# Patient Record
Sex: MALE | Race: WHITE | NOT HISPANIC OR LATINO
[De-identification: names, ages, dates, MRNs, and addresses within clinical notes are randomized per-mention and may not be internally consistent; named-entity substitution may affect disease eponyms.]

---

## 2017-05-31 ENCOUNTER — APPOINTMENT (OUTPATIENT)
Dept: CARDIOLOGY | Facility: CLINIC | Age: 62
End: 2017-05-31

## 2017-05-31 ENCOUNTER — NON-APPOINTMENT (OUTPATIENT)
Age: 62
End: 2017-05-31

## 2017-05-31 VITALS
SYSTOLIC BLOOD PRESSURE: 132 MMHG | OXYGEN SATURATION: 98 % | HEART RATE: 62 BPM | BODY MASS INDEX: 30.2 KG/M2 | DIASTOLIC BLOOD PRESSURE: 74 MMHG | WEIGHT: 223 LBS | HEIGHT: 72 IN

## 2017-12-06 ENCOUNTER — APPOINTMENT (OUTPATIENT)
Dept: CARDIOLOGY | Facility: CLINIC | Age: 62
End: 2017-12-06
Payer: COMMERCIAL

## 2017-12-06 VITALS
RESPIRATION RATE: 16 BRPM | WEIGHT: 233 LBS | HEIGHT: 72 IN | DIASTOLIC BLOOD PRESSURE: 74 MMHG | SYSTOLIC BLOOD PRESSURE: 144 MMHG | OXYGEN SATURATION: 98 % | HEART RATE: 62 BPM | BODY MASS INDEX: 31.56 KG/M2

## 2017-12-06 PROCEDURE — 93000 ELECTROCARDIOGRAM COMPLETE: CPT

## 2017-12-06 PROCEDURE — 99215 OFFICE O/P EST HI 40 MIN: CPT

## 2017-12-06 RX ORDER — DOXYCYCLINE HYCLATE 100 MG/1
100 CAPSULE ORAL
Qty: 14 | Refills: 0 | Status: DISCONTINUED | COMMUNITY
Start: 2017-10-25 | End: 2017-12-06

## 2018-05-09 ENCOUNTER — NON-APPOINTMENT (OUTPATIENT)
Age: 63
End: 2018-05-09

## 2018-05-09 ENCOUNTER — APPOINTMENT (OUTPATIENT)
Dept: CARDIOLOGY | Facility: CLINIC | Age: 63
End: 2018-05-09
Payer: COMMERCIAL

## 2018-05-09 VITALS
HEART RATE: 62 BPM | DIASTOLIC BLOOD PRESSURE: 74 MMHG | SYSTOLIC BLOOD PRESSURE: 147 MMHG | OXYGEN SATURATION: 97 % | HEIGHT: 72 IN | BODY MASS INDEX: 31.42 KG/M2 | WEIGHT: 232 LBS

## 2018-05-09 PROCEDURE — 93000 ELECTROCARDIOGRAM COMPLETE: CPT

## 2018-05-09 PROCEDURE — 99215 OFFICE O/P EST HI 40 MIN: CPT

## 2018-05-09 RX ORDER — PEN NEEDLE, DIABETIC 29 G X1/2"
31G X 5/16" NEEDLE, DISPOSABLE MISCELLANEOUS
Qty: 240 | Refills: 0 | Status: ACTIVE | COMMUNITY
Start: 2017-09-25

## 2018-05-09 RX ORDER — BLOOD SUGAR DIAGNOSTIC
STRIP MISCELLANEOUS
Qty: 300 | Refills: 0 | Status: ACTIVE | COMMUNITY
Start: 2017-09-25

## 2018-11-14 ENCOUNTER — NON-APPOINTMENT (OUTPATIENT)
Age: 63
End: 2018-11-14

## 2018-11-14 ENCOUNTER — APPOINTMENT (OUTPATIENT)
Dept: CARDIOLOGY | Facility: CLINIC | Age: 63
End: 2018-11-14
Payer: COMMERCIAL

## 2018-11-14 VITALS
HEART RATE: 64 BPM | DIASTOLIC BLOOD PRESSURE: 75 MMHG | SYSTOLIC BLOOD PRESSURE: 162 MMHG | WEIGHT: 232 LBS | OXYGEN SATURATION: 97 % | RESPIRATION RATE: 16 BRPM | BODY MASS INDEX: 31.42 KG/M2 | HEIGHT: 72 IN

## 2018-11-14 PROCEDURE — 99214 OFFICE O/P EST MOD 30 MIN: CPT

## 2018-11-14 PROCEDURE — 93000 ELECTROCARDIOGRAM COMPLETE: CPT

## 2018-11-20 NOTE — HISTORY OF PRESENT ILLNESS
[FreeTextEntry1] : Doing okay. Occasional feeling of anxiety in his chest. Denies chest pain, shortness of breath or palpitations. Occasional left shoulder pains unrelated to exertion. Also notes occasional fluttering in the chest that is brief and self-limited.

## 2018-11-20 NOTE — PHYSICAL EXAM
[General Appearance - Well Developed] : well developed [Normal Appearance] : normal appearance [Well Groomed] : well groomed [General Appearance - Well Nourished] : well nourished [No Deformities] : no deformities [General Appearance - In No Acute Distress] : no acute distress [Normal Conjunctiva] : the conjunctiva exhibited no abnormalities [Eyelids - No Xanthelasma] : the eyelids demonstrated no xanthelasmas [Normal Oral Mucosa] : normal oral mucosa [No Oral Pallor] : no oral pallor [No Oral Cyanosis] : no oral cyanosis [Respiration, Rhythm And Depth] : normal respiratory rhythm and effort [Exaggerated Use Of Accessory Muscles For Inspiration] : no accessory muscle use [Auscultation Breath Sounds / Voice Sounds] : lungs were clear to auscultation bilaterally [Heart Rate And Rhythm] : heart rate and rhythm were normal [Heart Sounds] : normal S1 and S2 [Murmurs] : no murmurs present [Edema] : no peripheral edema present [Abdomen Soft] : soft [Abdomen Tenderness] : non-tender [Nail Clubbing] : no clubbing of the fingernails [Cyanosis, Localized] : no localized cyanosis [Petechial Hemorrhages (___cm)] : no petechial hemorrhages [Skin Color & Pigmentation] : normal skin color and pigmentation [] : no rash [No Venous Stasis] : no venous stasis [Skin Lesions] : no skin lesions [No Skin Ulcers] : no skin ulcer [No Xanthoma] : no  xanthoma was observed [Oriented To Time, Place, And Person] : oriented to person, place, and time [Affect] : the affect was normal [Mood] : the mood was normal [No Anxiety] : not feeling anxious [FreeTextEntry1] : no carotid bruits or JVD

## 2018-11-20 NOTE — DISCUSSION/SUMMARY
[Cardiomyopathy] : cardiomyopathy [Coronary Artery Disease] : coronary artery disease [Stable] : stable [Hypertension] : hypertension [FreeTextEntry1] : \par Currently stable from a cardiovascular standpoint. Hypertensive today. Chronic systolic heart failure secondary to ischemic cardiomyopathy. Currently euvolemic. Stable CAD (prior CABG and stents). Atypical chest symptoms. Continue current medications. Given patient’s CAD history, will schedule a pharmacologic nuclear stress test to rule out any significant CAD/ischemia. In addition, will schedule an echocardiogram to reassess his cardiac structures and function. Pending the test results, I will make further recommendations. Follow up in 4 months.

## 2018-12-19 ENCOUNTER — RX RENEWAL (OUTPATIENT)
Age: 63
End: 2018-12-19

## 2018-12-28 ENCOUNTER — APPOINTMENT (OUTPATIENT)
Dept: CV DIAGNOSTICS | Facility: HOSPITAL | Age: 63
End: 2018-12-28
Payer: COMMERCIAL

## 2018-12-28 ENCOUNTER — APPOINTMENT (OUTPATIENT)
Dept: CV DIAGNOSITCS | Facility: HOSPITAL | Age: 63
End: 2018-12-28
Payer: COMMERCIAL

## 2018-12-28 ENCOUNTER — OUTPATIENT (OUTPATIENT)
Dept: OUTPATIENT SERVICES | Facility: HOSPITAL | Age: 63
LOS: 1 days | End: 2018-12-28

## 2018-12-28 DIAGNOSIS — Z98.89 OTHER SPECIFIED POSTPROCEDURAL STATES: Chronic | ICD-10-CM

## 2018-12-28 DIAGNOSIS — I25.10 ATHEROSCLEROTIC HEART DISEASE OF NATIVE CORONARY ARTERY WITHOUT ANGINA PECTORIS: ICD-10-CM

## 2018-12-28 DIAGNOSIS — Z95.1 PRESENCE OF AORTOCORONARY BYPASS GRAFT: Chronic | ICD-10-CM

## 2018-12-28 PROCEDURE — 93018 CV STRESS TEST I&R ONLY: CPT | Mod: GC

## 2018-12-28 PROCEDURE — 78452 HT MUSCLE IMAGE SPECT MULT: CPT | Mod: 26

## 2018-12-28 PROCEDURE — 93016 CV STRESS TEST SUPVJ ONLY: CPT | Mod: GC

## 2018-12-28 PROCEDURE — 93306 TTE W/DOPPLER COMPLETE: CPT | Mod: 26

## 2019-06-20 ENCOUNTER — NON-APPOINTMENT (OUTPATIENT)
Age: 64
End: 2019-06-20

## 2019-06-20 ENCOUNTER — APPOINTMENT (OUTPATIENT)
Dept: CARDIOLOGY | Facility: CLINIC | Age: 64
End: 2019-06-20
Payer: COMMERCIAL

## 2019-06-20 VITALS
SYSTOLIC BLOOD PRESSURE: 142 MMHG | RESPIRATION RATE: 16 BRPM | BODY MASS INDEX: 31.15 KG/M2 | HEART RATE: 60 BPM | HEIGHT: 72 IN | DIASTOLIC BLOOD PRESSURE: 76 MMHG | WEIGHT: 230 LBS | OXYGEN SATURATION: 97 %

## 2019-06-20 PROCEDURE — 93000 ELECTROCARDIOGRAM COMPLETE: CPT

## 2019-06-20 PROCEDURE — 99214 OFFICE O/P EST MOD 30 MIN: CPT

## 2019-06-20 NOTE — DISCUSSION/SUMMARY
[Cardiomyopathy] : cardiomyopathy [Coronary Artery Disease] : coronary artery disease [Hypertension] : hypertension [Stable] : stable [FreeTextEntry1] : \par Currently stable from a cardiovascular standpoint. Hypertensive today. Chronic systolic heart failure secondary to ischemic cardiomyopathy. Currently euvolemic. Stable CAD. No ischemic or CHF symptoms. ECG completed today and reviewed. Continue current medications. Follow up in 6 months.

## 2019-06-20 NOTE — HISTORY OF PRESENT ILLNESS
[FreeTextEntry1] : Doing okay. Recently had a stomach virus which caused violent vomiting and diarrhea. In that setting, he developed hemorrhage in the right eye. He is anticipating right surgery. Denies chest pain, shortness of breath or palpitations. Denies dizziness or lightheadedness.

## 2019-06-20 NOTE — PHYSICAL EXAM
[General Appearance - Well Developed] : well developed [Normal Appearance] : normal appearance [Well Groomed] : well groomed [General Appearance - Well Nourished] : well nourished [No Deformities] : no deformities [General Appearance - In No Acute Distress] : no acute distress [Normal Conjunctiva] : the conjunctiva exhibited no abnormalities [Eyelids - No Xanthelasma] : the eyelids demonstrated no xanthelasmas [Normal Oral Mucosa] : normal oral mucosa [No Oral Pallor] : no oral pallor [No Oral Cyanosis] : no oral cyanosis [FreeTextEntry1] : no carotid bruits or JVD [Respiration, Rhythm And Depth] : normal respiratory rhythm and effort [Exaggerated Use Of Accessory Muscles For Inspiration] : no accessory muscle use [Auscultation Breath Sounds / Voice Sounds] : lungs were clear to auscultation bilaterally [Heart Rate And Rhythm] : heart rate and rhythm were normal [Heart Sounds] : normal S1 and S2 [Murmurs] : no murmurs present [Edema] : no peripheral edema present [Abdomen Soft] : soft [Abdomen Tenderness] : non-tender [Cyanosis, Localized] : no localized cyanosis [Skin Color & Pigmentation] : normal skin color and pigmentation [] : no rash [No Venous Stasis] : no venous stasis [Oriented To Time, Place, And Person] : oriented to person, place, and time [Affect] : the affect was normal [Mood] : the mood was normal [No Anxiety] : not feeling anxious

## 2019-11-28 ENCOUNTER — RX RENEWAL (OUTPATIENT)
Age: 64
End: 2019-11-28

## 2019-12-26 ENCOUNTER — NON-APPOINTMENT (OUTPATIENT)
Age: 64
End: 2019-12-26

## 2019-12-26 ENCOUNTER — APPOINTMENT (OUTPATIENT)
Dept: CARDIOLOGY | Facility: CLINIC | Age: 64
End: 2019-12-26
Payer: MEDICARE

## 2019-12-26 VITALS
WEIGHT: 230 LBS | OXYGEN SATURATION: 97 % | HEIGHT: 72 IN | DIASTOLIC BLOOD PRESSURE: 77 MMHG | SYSTOLIC BLOOD PRESSURE: 146 MMHG | BODY MASS INDEX: 31.15 KG/M2 | RESPIRATION RATE: 16 BRPM | HEART RATE: 61 BPM

## 2019-12-26 PROCEDURE — 99214 OFFICE O/P EST MOD 30 MIN: CPT

## 2019-12-26 PROCEDURE — 93000 ELECTROCARDIOGRAM COMPLETE: CPT

## 2019-12-26 NOTE — PHYSICAL EXAM
[General Appearance - Well Developed] : well developed [Normal Appearance] : normal appearance [General Appearance - Well Nourished] : well nourished [Well Groomed] : well groomed [No Deformities] : no deformities [General Appearance - In No Acute Distress] : no acute distress [Eyelids - No Xanthelasma] : the eyelids demonstrated no xanthelasmas [Normal Conjunctiva] : the conjunctiva exhibited no abnormalities [Normal Oral Mucosa] : normal oral mucosa [No Oral Pallor] : no oral pallor [No Oral Cyanosis] : no oral cyanosis [Respiration, Rhythm And Depth] : normal respiratory rhythm and effort [Exaggerated Use Of Accessory Muscles For Inspiration] : no accessory muscle use [Heart Sounds] : normal S1 and S2 [Auscultation Breath Sounds / Voice Sounds] : lungs were clear to auscultation bilaterally [Heart Rate And Rhythm] : heart rate and rhythm were normal [Murmurs] : no murmurs present [Edema] : no peripheral edema present [Abdomen Soft] : soft [Abdomen Tenderness] : non-tender [Cyanosis, Localized] : no localized cyanosis [Skin Color & Pigmentation] : normal skin color and pigmentation [] : no ischemic changes [No Venous Stasis] : no venous stasis [Oriented To Time, Place, And Person] : oriented to person, place, and time [Affect] : the affect was normal [Mood] : the mood was normal [No Anxiety] : not feeling anxious [FreeTextEntry1] : obese

## 2019-12-26 NOTE — DISCUSSION/SUMMARY
[Cardiomyopathy] : cardiomyopathy [Coronary Artery Disease] : coronary artery disease [Hypertension] : hypertension [Stable] : stable [Hyperlipidemia] : hyperlipidemia [FreeTextEntry1] : \par Currently stable from a cardiovascular standpoint. Hypertensive today. Chronic systolic heart failure secondary to ischemic cardiomyopathy (LVEF 35-40%). Currently euvolemic. Stable CAD (s/p CABG, mid Cx ). No ischemic or CHF symptoms. Non-fasting lipid profile from October reviewed (chol 118, LDL 48, HDL 42, trig 226). Lipids optimized. Continue current medications. ECG completed today and reviewed (findings as noted above). Follow up in 4 months. Will repeat echo after our next visit to reassess LVEF.

## 2019-12-26 NOTE — REASON FOR VISIT
[Coronary Artery Disease] : coronary artery disease [Hypertension] : hypertension [Follow-Up - Clinic] : a clinic follow-up of

## 2019-12-26 NOTE — HISTORY OF PRESENT ILLNESS
[FreeTextEntry1] : Doing okay. Denies chest pain, shortness of breath or palpitations. Has left ear issues/pain.

## 2020-05-14 ENCOUNTER — APPOINTMENT (OUTPATIENT)
Dept: CARDIOLOGY | Facility: CLINIC | Age: 65
End: 2020-05-14

## 2020-06-25 ENCOUNTER — NON-APPOINTMENT (OUTPATIENT)
Age: 65
End: 2020-06-25

## 2020-06-25 ENCOUNTER — APPOINTMENT (OUTPATIENT)
Dept: CARDIOLOGY | Facility: CLINIC | Age: 65
End: 2020-06-25
Payer: MEDICARE

## 2020-06-25 VITALS
BODY MASS INDEX: 30.88 KG/M2 | WEIGHT: 228 LBS | TEMPERATURE: 96.5 F | DIASTOLIC BLOOD PRESSURE: 67 MMHG | SYSTOLIC BLOOD PRESSURE: 148 MMHG | HEART RATE: 59 BPM | HEIGHT: 72 IN | OXYGEN SATURATION: 98 %

## 2020-06-25 PROCEDURE — 99214 OFFICE O/P EST MOD 30 MIN: CPT

## 2020-06-25 PROCEDURE — 93000 ELECTROCARDIOGRAM COMPLETE: CPT

## 2020-06-25 NOTE — DISCUSSION/SUMMARY
[Coronary Artery Disease] : coronary artery disease [Chronic Systolic Heart Failure] : chronic systolic congestive heart failure [Stable] : stable [Compensated] : compensated [Hypertension] : hypertension [FreeTextEntry1] : \par Currently stable from a cardiovascular standpoint. Hypertensive. Chronic systolic heart failure secondary to ischemic cardiomyopathy. Currently euvolemic. Stable CAD (s/p CABG, prior stents)). No ischemic or CHF symptoms. Continue current medications. ECG completed today and reviewed (baseline LBBB). Follow up in 6 months.

## 2020-06-25 NOTE — PHYSICAL EXAM
[General Appearance - Well Developed] : well developed [Normal Appearance] : normal appearance [Well Groomed] : well groomed [General Appearance - Well Nourished] : well nourished [No Deformities] : no deformities [General Appearance - In No Acute Distress] : no acute distress [Normal Conjunctiva] : the conjunctiva exhibited no abnormalities [Eyelids - No Xanthelasma] : the eyelids demonstrated no xanthelasmas [Normal Oral Mucosa] : normal oral mucosa [No Oral Pallor] : no oral pallor [No Oral Cyanosis] : no oral cyanosis [FreeTextEntry1] : no carotid bruits or JVD [Respiration, Rhythm And Depth] : normal respiratory rhythm and effort [Exaggerated Use Of Accessory Muscles For Inspiration] : no accessory muscle use [Auscultation Breath Sounds / Voice Sounds] : lungs were clear to auscultation bilaterally [Heart Rate And Rhythm] : heart rate and rhythm were normal [Heart Sounds] : normal S1 and S2 [Edema] : no peripheral edema present [Murmurs] : no murmurs present [Abdomen Soft] : soft [Abdomen Tenderness] : non-tender [Cyanosis, Localized] : no localized cyanosis [] : no rash [Skin Color & Pigmentation] : normal skin color and pigmentation [No Venous Stasis] : no venous stasis [Affect] : the affect was normal [Oriented To Time, Place, And Person] : oriented to person, place, and time [Mood] : the mood was normal [No Anxiety] : not feeling anxious

## 2020-12-31 ENCOUNTER — NON-APPOINTMENT (OUTPATIENT)
Age: 65
End: 2020-12-31

## 2020-12-31 ENCOUNTER — APPOINTMENT (OUTPATIENT)
Dept: CARDIOLOGY | Facility: CLINIC | Age: 65
End: 2020-12-31
Payer: MEDICARE

## 2020-12-31 VITALS
WEIGHT: 234 LBS | TEMPERATURE: 98 F | DIASTOLIC BLOOD PRESSURE: 64 MMHG | BODY MASS INDEX: 31.69 KG/M2 | OXYGEN SATURATION: 98 % | SYSTOLIC BLOOD PRESSURE: 128 MMHG | HEART RATE: 61 BPM | HEIGHT: 72 IN

## 2020-12-31 PROCEDURE — 93000 ELECTROCARDIOGRAM COMPLETE: CPT

## 2020-12-31 PROCEDURE — 99214 OFFICE O/P EST MOD 30 MIN: CPT

## 2020-12-31 NOTE — HISTORY OF PRESENT ILLNESS
[FreeTextEntry1] : Doing okay. Denies chest pain, shortness of breath or palpitations. Occasional left arm/hand pains.

## 2020-12-31 NOTE — DISCUSSION/SUMMARY
[Coronary Artery Disease] : coronary artery disease [Chronic Systolic Heart Failure] : chronic systolic congestive heart failure [Compensated] : compensated [Hypertension] : hypertension [Stable] : stable [FreeTextEntry1] : \par Currently stable from a cardiovascular standpoint. Normotensive. History of ischemic cardiomyopathy. Currently euvolemic. Stable CAD (s/p CABG, oRCA stent). No ischemic or CHF symptoms. Left arm pains likely secondary to left shoulder. Continue current medications. ECG completed today and reviewed. Follow up in 6 months.

## 2021-07-01 ENCOUNTER — APPOINTMENT (OUTPATIENT)
Dept: CARDIOLOGY | Facility: CLINIC | Age: 66
End: 2021-07-01
Payer: MEDICARE

## 2021-07-01 ENCOUNTER — NON-APPOINTMENT (OUTPATIENT)
Age: 66
End: 2021-07-01

## 2021-07-01 VITALS
HEART RATE: 61 BPM | SYSTOLIC BLOOD PRESSURE: 146 MMHG | WEIGHT: 233 LBS | RESPIRATION RATE: 16 BRPM | DIASTOLIC BLOOD PRESSURE: 66 MMHG | BODY MASS INDEX: 31.56 KG/M2 | OXYGEN SATURATION: 97 % | TEMPERATURE: 97.2 F | HEIGHT: 72 IN

## 2021-07-01 PROCEDURE — 93000 ELECTROCARDIOGRAM COMPLETE: CPT

## 2021-07-01 PROCEDURE — 99214 OFFICE O/P EST MOD 30 MIN: CPT

## 2021-07-05 NOTE — DISCUSSION/SUMMARY
[Cardiomyopathy] : cardiomyopathy [Coronary Artery Disease] : coronary artery disease [Stable] : stable [Hypertension] : hypertension [FreeTextEntry1] : \par Currently stable from a cardiac standpoint. Hypertensive today. Chronic systolic heart failures secondary to ischemic cardiomyopathy (LVEF 42%). Currently euvolemic. Stable CAD (s/p CABG, RCA and Cx stents). Asymptomatic. Continue current medications. ECG completed today and reviewed. Follow up in 6 months.

## 2021-07-05 NOTE — CARDIOLOGY SUMMARY
[de-identified] : \par 07/01/21 - normal sinus rhythm, first degree AV block, LBBB\par  [de-identified] : \par 12/28/18 (regadenoson MIBI) - small, moderate perfusion defect in basal inferolateral and lateral walls that is predominantly reversible, consistent with infarction with moderate edwina-infarct ischemia, LVEF 42%\par  [de-identified] : \par 12/28/18 - normal LA, mild to moderate global LV systolic dysfunction, normal RV size and function, RVSP 36 mmHg, LVEF 35%\par  [de-identified] : \par 08/16/16 (Diag) - mLM 20%, pLAD 50%, mLAD 90%, oCx 80%, mCx 100% ISR with collaterals from D1, mRamus 60%, oRCA 20% ISR, mRCA 30%, patent LIMA-mLAD, LVEDP 14 mmHg\par 11/15/05 (PCI) - TAXUS stent to oRCA\par 10/14/03 (PCI) - CYPHER stent to prox/mid Cx\par 04/21/03 (PCI) - BMS to pCx\par  [de-identified] : \par 06/29/04 - CABG x 3 vessels

## 2021-09-30 ENCOUNTER — RX RENEWAL (OUTPATIENT)
Age: 66
End: 2021-09-30

## 2022-01-12 ENCOUNTER — NON-APPOINTMENT (OUTPATIENT)
Age: 67
End: 2022-01-12

## 2022-01-12 ENCOUNTER — APPOINTMENT (OUTPATIENT)
Dept: CARDIOLOGY | Facility: CLINIC | Age: 67
End: 2022-01-12
Payer: MEDICARE

## 2022-01-12 VITALS — WEIGHT: 235 LBS | BODY MASS INDEX: 31.87 KG/M2

## 2022-01-12 VITALS
DIASTOLIC BLOOD PRESSURE: 63 MMHG | SYSTOLIC BLOOD PRESSURE: 156 MMHG | BODY MASS INDEX: 31.87 KG/M2 | HEART RATE: 68 BPM | OXYGEN SATURATION: 98 % | HEIGHT: 72 IN

## 2022-01-12 VITALS — DIASTOLIC BLOOD PRESSURE: 80 MMHG | SYSTOLIC BLOOD PRESSURE: 150 MMHG

## 2022-01-12 PROCEDURE — 99214 OFFICE O/P EST MOD 30 MIN: CPT

## 2022-01-12 PROCEDURE — 93000 ELECTROCARDIOGRAM COMPLETE: CPT

## 2022-01-17 NOTE — CARDIOLOGY SUMMARY
[de-identified] : \par 01/12/22 - normal sinus rhythm, first degree AV block, LBBB\par  [de-identified] : \par 12/28/18 (regadenoson MIBI) - small, moderate perfusion defect in basal inferolateral and lateral walls that is predominantly reversible, consistent with infarction with moderate edwina-infarct ischemia, LVEF 42%\par  [de-identified] : \par 12/28/18 - normal LA, mild to moderate global LV systolic dysfunction, normal RV size and function, RVSP 36 mmHg, LVEF 35%\par  [de-identified] : \par 08/16/16 (CATH) - mLM 20%, pLAD 50%, mLAD 90%, oCx 80%, mCx 100% ISR with collaterals from D1, mRamus 60%, oRCA 20% ISR, mRCA 30%, patent LIMA-mLAD, LVEDP 14 mmHg\par 11/15/05 (PCI) - TAXUS stent to oRCA\par 10/14/03 (PCI) - CYPHER stent to prox/mid Cx\par 04/21/03 (PCI) - BMS to pCx\par  [de-identified] : \par 06/29/04 - CABG x 3 vessels

## 2022-01-17 NOTE — HISTORY OF PRESENT ILLNESS
[FreeTextEntry1] : Doing okay. Denies chest pain, shortness of breath or palpitations. BP has been running a bit high lately.

## 2022-01-17 NOTE — DISCUSSION/SUMMARY
[Coronary Artery Disease] : coronary artery disease [Systolic Heart Failure] : systolic heart failure [Stable] : stable [Compensated] : compensated [Hypertension] : hypertension [Medication Changes Per Orders] : Medication changes are as documented in orders [FreeTextEntry1] : \par Currently stable from a cardiac standpoint. Hypertensive today. Chronic systolic heart failures secondary to ischemic cardiomyopathy (LVEF 42%). Currently euvolemic. Stable CAD (s/p CABG, RCA and Cx stents). Asymptomatic. At this time, patient is considered ACC/AHA CHF stage B. Will start hydralazine 25 mg twice daily for BP control. Continue all other current medications. ECG completed today and reviewed. Follow up in 3 months.

## 2022-05-03 RX ORDER — ROSUVASTATIN CALCIUM 20 MG/1
20 TABLET, FILM COATED ORAL
Qty: 30 | Refills: 5 | Status: DISCONTINUED | COMMUNITY
Start: 2017-12-06 | End: 2022-05-03

## 2022-07-07 ENCOUNTER — APPOINTMENT (OUTPATIENT)
Dept: CARDIOLOGY | Facility: CLINIC | Age: 67
End: 2022-07-07

## 2022-07-07 ENCOUNTER — NON-APPOINTMENT (OUTPATIENT)
Age: 67
End: 2022-07-07

## 2022-07-07 VITALS
WEIGHT: 234 LBS | HEART RATE: 56 BPM | HEIGHT: 72 IN | OXYGEN SATURATION: 98 % | BODY MASS INDEX: 31.69 KG/M2 | SYSTOLIC BLOOD PRESSURE: 151 MMHG | DIASTOLIC BLOOD PRESSURE: 70 MMHG

## 2022-07-07 VITALS — SYSTOLIC BLOOD PRESSURE: 142 MMHG | DIASTOLIC BLOOD PRESSURE: 70 MMHG

## 2022-07-07 PROCEDURE — 99214 OFFICE O/P EST MOD 30 MIN: CPT

## 2022-07-07 PROCEDURE — 93000 ELECTROCARDIOGRAM COMPLETE: CPT

## 2022-07-07 RX ORDER — FLUTICASONE PROPIONATE 50 UG/1
50 SPRAY, METERED NASAL
Qty: 16 | Refills: 0 | Status: ACTIVE | COMMUNITY
Start: 2022-04-28

## 2022-07-07 RX ORDER — HYDRALAZINE HYDROCHLORIDE 25 MG/1
25 TABLET ORAL TWICE DAILY
Qty: 60 | Refills: 5 | Status: DISCONTINUED | COMMUNITY
Start: 2022-01-12 | End: 2022-07-07

## 2022-07-07 RX ORDER — ERGOCALCIFEROL 1.25 MG/1
1.25 MG CAPSULE, LIQUID FILLED ORAL
Qty: 4 | Refills: 0 | Status: ACTIVE | COMMUNITY
Start: 2021-10-16

## 2022-07-07 RX ORDER — INSULIN LISPRO 100 [IU]/ML
100 INJECTION, SOLUTION INTRAVENOUS; SUBCUTANEOUS
Qty: 80 | Refills: 0 | Status: ACTIVE | COMMUNITY
Start: 2022-05-17

## 2022-07-07 NOTE — CARDIOLOGY SUMMARY
[de-identified] : \par 07/07/22 - sinus bradycardia, 56 bpm, first degree AV block, LBBB\par  [de-identified] : \par 12/28/18 (regadenoson MIBI) - small, moderate perfusion defect in basal inferolateral and lateral walls that is predominantly reversible, consistent with infarction with moderate edwina-infarct ischemia, LVEF 42%\par  [de-identified] : \par 12/28/18 - normal LA, mild to moderate global LV systolic dysfunction, normal RV size and function, RVSP 36 mmHg, LVEF 35%\par  [de-identified] : \par 08/16/16 (CATH) - mLM 20%, pLAD 50%, mLAD 90%, oCx 80%, mCx 100% ISR with collaterals from D1, mRamus 60%, oRCA 20% ISR, mRCA 30%, patent LIMA-mLAD, LVEDP 14 mmHg\par 11/15/05 (PCI) - TAXUS stent to oRCA\par 10/14/03 (PCI) - CYPHER stent to prox/mid Cx\par 04/21/03 (PCI) - BMS to pCx\par  [de-identified] : \par 06/29/04 - CABG x 3 vessels

## 2022-07-07 NOTE — DISCUSSION/SUMMARY
[Cardiomyopathy] : cardiomyopathy [Coronary Artery Disease] : coronary artery disease [Hypertension] : hypertension [Weight Loss] : weight loss [Low Sodium Diet] : low sodium diet [EKG obtained to assist in diagnosis and management of assessed problem(s)] : EKG obtained to assist in diagnosis and management of assessed problem(s) [Hyperlipidemia] : hyperlipidemia [Stable] : stable [FreeTextEntry1] : \par Currently stable from a cardiac standpoint. Hypertensive today (BP has been mostly 130's mmHg systolic at home). Chronic systolic heart failures secondary to ischemic cardiomyopathy (LVEF 42%). Currently euvolemic. Stable CAD (s/p CABG, RCA and Cx stents). Asymptomatic. At this time, patient is considered ACC/AHA CHF stage B. Patient unable to tolerate hydralazine. Continue current medications. ECG completed today and reviewed. Recent labs reviewed. Lipids optimized. Low salt diet and weight loss advised. Patient to follow BP readings at home. Follow up in 6 months. May consider starting Entresto pending BP control.

## 2022-09-01 ENCOUNTER — RX RENEWAL (OUTPATIENT)
Age: 67
End: 2022-09-01

## 2023-01-05 ENCOUNTER — APPOINTMENT (OUTPATIENT)
Dept: CARDIOLOGY | Facility: CLINIC | Age: 68
End: 2023-01-05
Payer: MEDICARE

## 2023-01-05 ENCOUNTER — NON-APPOINTMENT (OUTPATIENT)
Age: 68
End: 2023-01-05

## 2023-01-05 VITALS
HEIGHT: 72 IN | HEART RATE: 55 BPM | WEIGHT: 230 LBS | OXYGEN SATURATION: 99 % | DIASTOLIC BLOOD PRESSURE: 73 MMHG | BODY MASS INDEX: 31.15 KG/M2 | SYSTOLIC BLOOD PRESSURE: 146 MMHG

## 2023-01-05 PROCEDURE — 93000 ELECTROCARDIOGRAM COMPLETE: CPT

## 2023-01-05 PROCEDURE — 99215 OFFICE O/P EST HI 40 MIN: CPT

## 2023-01-10 NOTE — CARDIOLOGY SUMMARY
[de-identified] : \par 01/05/23 - sinus bradycardia, 55 bpm, first degree AV block, LBBB\par  [de-identified] : \par 12/28/18 (regadenoson MIBI) - small, moderate perfusion defect in basal inferolateral and lateral walls that is predominantly reversible, consistent with infarction with moderate edwina-infarct ischemia, LVEF 42%\par  [de-identified] : \par 12/28/18 - normal LA, mild to moderate global LV systolic dysfunction, normal RV size and function, RVSP 36 mmHg, LVEF 35%\par  [de-identified] : \par 08/16/16 (CATH) - mLM 20%, pLAD 50%, mLAD 90%, oCx 80%, mCx 100% ISR with collaterals from D1, mRamus 60%, oRCA 20% ISR, mRCA 30%, patent LIMA-mLAD, LVEDP 14 mmHg\par 11/15/05 (PCI) - TAXUS stent to oRCA\par 10/14/03 (PCI) - CYPHER stent to prox/mid Cx\par 04/21/03 (PCI) - BMS to pCx\par  [de-identified] : \par 06/29/04 - CABG x 3 vessels

## 2023-01-10 NOTE — DISCUSSION/SUMMARY
[Hyperlipidemia] : hyperlipidemia [Coronary Artery Disease] : coronary artery disease [Systolic Heart Failure] : systolic heart failure [Stable] : stable [Compensated] : compensated [Medication Changes Per Orders] : Medication changes are as documented in orders [Hypertension] : hypertension [Low Sodium Diet] : low sodium diet [FreeTextEntry1] : \par Currently stable from a cardiac standpoint. Hypertensive today (BP has been mostly 130's mmHg systolic at home). Chronic systolic heart failures secondary to ischemic cardiomyopathy (LVEF 42%). Currently euvolemic. Stable CAD (s/p CABG, RCA and Cx stents). Asymptomatic. At this time, patient is considered ACC/AHA CHF stage B. Patient unable to tolerate hydralazine. Will start Entresto 24-26 mg twice daily for management of systolic heart failure. Will discontinue Vasotec. Continue all other current medications. ECG completed today and reviewed. Recent labs from 11/15/22 reviewed. Lipids optimized (chol 126, LDL 65, HDL 40, trig 103). Low salt diet and weight loss advised. Patient to follow BP readings at home. Follow up in 2 months. [EKG obtained to assist in diagnosis and management of assessed problem(s)] : EKG obtained to assist in diagnosis and management of assessed problem(s)

## 2023-03-20 ENCOUNTER — APPOINTMENT (OUTPATIENT)
Dept: CARDIOLOGY | Facility: CLINIC | Age: 68
End: 2023-03-20
Payer: MEDICARE

## 2023-03-20 ENCOUNTER — NON-APPOINTMENT (OUTPATIENT)
Age: 68
End: 2023-03-20

## 2023-03-20 VITALS — WEIGHT: 227 LBS | BODY MASS INDEX: 30.79 KG/M2

## 2023-03-20 VITALS
BODY MASS INDEX: 30.79 KG/M2 | SYSTOLIC BLOOD PRESSURE: 143 MMHG | DIASTOLIC BLOOD PRESSURE: 77 MMHG | HEART RATE: 61 BPM | OXYGEN SATURATION: 99 % | HEIGHT: 72 IN

## 2023-03-20 PROCEDURE — 93000 ELECTROCARDIOGRAM COMPLETE: CPT

## 2023-03-20 PROCEDURE — 99214 OFFICE O/P EST MOD 30 MIN: CPT

## 2023-03-20 NOTE — DISCUSSION/SUMMARY
[Coronary Artery Disease] : coronary artery disease [Systolic Heart Failure] : systolic heart failure [Compensated] : compensated [Medication Changes Per Orders] : Medication changes are as documented in orders [Hyperlipidemia] : hyperlipidemia [Stable] : stable [Hypertension] : hypertension [Low Sodium Diet] : low sodium diet [FreeTextEntry1] : \par Currently stable from a cardiac standpoint. Hypertensive today (BP has been mostly 130's mmHg systolic at home). Chronic systolic heart failures secondary to ischemic cardiomyopathy (LVEF 42%). Currently euvolemic. Stable CAD (s/p CABG, RCA and Cx stents). Asymptomatic. At this time, patient is considered ACC/AHA CHF stage B. Will increase Entresto to 49-51 mg twice daily for management of systolic heart failure. Continue all other current medications. ECG completed today and reviewed. Recent labs from 02/24/23 reviewed. Lipids optimized (chol 128, LDL 69, HDL 42, trig 86). Potassium 4.5 and creatinine 1.54. Low salt diet advised. Will obtain an echo to reassess his cardiac structures and function. Follow up in 4 months. [EKG obtained to assist in diagnosis and management of assessed problem(s)] : EKG obtained to assist in diagnosis and management of assessed problem(s)

## 2023-03-20 NOTE — CARDIOLOGY SUMMARY
[de-identified] : \par 03/20/23 - sinus bradycardia, 59 bpm, first degree AV block, LBBB\par  [de-identified] : \par 12/28/18 (regadenoson MIBI) - small, moderate perfusion defect in basal inferolateral and lateral walls that is predominantly reversible, consistent with infarction with moderate edwina-infarct ischemia, LVEF 42%\par  [de-identified] : \par 12/28/18 - normal LA, mild to moderate global LV systolic dysfunction, normal RV size and function, RVSP 36 mmHg, LVEF 35%\par  [de-identified] : \par 06/29/04 - CABG x 3 vessels [de-identified] : \par 08/16/16 (CATH) - mLM 20%, pLAD 50%, mLAD 90%, oCx 80%, mCx 100% ISR with collaterals from D1, mRamus 60%, oRCA 20% ISR, mRCA 30%, patent LIMA-mLAD, LVEDP 14 mmHg\par 11/15/05 (PCI) - TAXUS stent to oRCA\par 10/14/03 (PCI) - CYPHER stent to prox/mid Cx\par 04/21/03 (PCI) - BMS to pCx\par

## 2023-03-20 NOTE — HISTORY OF PRESENT ILLNESS
[FreeTextEntry1] : Currently doing well. Denies chest pain, shortness of breath or palpitations. Anticipating potential lip surgery. Of note, he is taking antibiotics for a toe infection.

## 2023-03-30 ENCOUNTER — APPOINTMENT (OUTPATIENT)
Dept: CV DIAGNOSITCS | Facility: HOSPITAL | Age: 68
End: 2023-03-30

## 2023-03-30 ENCOUNTER — OUTPATIENT (OUTPATIENT)
Dept: OUTPATIENT SERVICES | Facility: HOSPITAL | Age: 68
LOS: 1 days | End: 2023-03-30
Payer: MEDICARE

## 2023-03-30 DIAGNOSIS — Z95.1 PRESENCE OF AORTOCORONARY BYPASS GRAFT: Chronic | ICD-10-CM

## 2023-03-30 DIAGNOSIS — Z98.89 OTHER SPECIFIED POSTPROCEDURAL STATES: Chronic | ICD-10-CM

## 2023-03-30 DIAGNOSIS — D33.2 BENIGN NEOPLASM OF BRAIN, UNSPECIFIED: Chronic | ICD-10-CM

## 2023-03-30 DIAGNOSIS — I50.22 CHRONIC SYSTOLIC (CONGESTIVE) HEART FAILURE: ICD-10-CM

## 2023-03-30 PROCEDURE — 93306 TTE W/DOPPLER COMPLETE: CPT | Mod: 26

## 2023-07-06 ENCOUNTER — NON-APPOINTMENT (OUTPATIENT)
Age: 68
End: 2023-07-06

## 2023-07-06 ENCOUNTER — APPOINTMENT (OUTPATIENT)
Dept: CARDIOLOGY | Facility: CLINIC | Age: 68
End: 2023-07-06
Payer: MEDICARE

## 2023-07-06 VITALS
HEIGHT: 72 IN | HEART RATE: 59 BPM | DIASTOLIC BLOOD PRESSURE: 66 MMHG | SYSTOLIC BLOOD PRESSURE: 144 MMHG | OXYGEN SATURATION: 96 %

## 2023-07-06 DIAGNOSIS — I25.5 ISCHEMIC CARDIOMYOPATHY: ICD-10-CM

## 2023-07-06 PROCEDURE — 93000 ELECTROCARDIOGRAM COMPLETE: CPT

## 2023-07-06 PROCEDURE — 99214 OFFICE O/P EST MOD 30 MIN: CPT

## 2023-07-10 NOTE — CARDIOLOGY SUMMARY
[de-identified] : \par 07/06/23 - sinus bradycardia, 58 bpm, first degree AV block, LBBB\par  [de-identified] : \par 12/28/18 (regadenoson MIBI) - small, moderate perfusion defect in basal inferolateral and lateral walls that is predominantly reversible, consistent with infarction with moderate edwina-infarct ischemia, LVEF 42%\par  [de-identified] : \par 03/30/23 - MAC, calcified AV with normal opening, moderate segmental LV systolic dysfunction, mild diastolic dysfunction, normal RV size and function, LVEF 40-45%\par 12/28/18 - normal LA, mild to moderate global LV systolic dysfunction, normal RV size and function, RVSP 36 mmHg, LVEF 35%\par  [de-identified] : \par 08/16/16 (CATH) - mLM 20%, pLAD 50%, mLAD 90%, oCx 80%, mCx 100% ISR with collaterals from D1, mRamus 60%, oRCA 20% ISR, mRCA 30%, patent LIMA-mLAD, LVEDP 14 mmHg\par 11/15/05 (PCI) - TAXUS stent to oRCA\par 10/14/03 (PCI) - CYPHER stent to prox/mid Cx\par 04/21/03 (PCI) - BMS to pCx\par  [de-identified] : \par 06/29/04 - CABG x 3 vessels

## 2023-07-10 NOTE — DISCUSSION/SUMMARY
[Coronary Artery Disease] : coronary artery disease [Systolic Heart Failure] : systolic heart failure [Stable] : stable [Compensated] : compensated [Medication Changes Per Orders] : Medication changes are as documented in orders [Hypertension] : hypertension [Low Sodium Diet] : low sodium diet [FreeTextEntry1] : \par Currently stable from a cardiac standpoint. Hypertensive today (BP has been mostly 130's mmHg systolic at home). Chronic systolic heart failures secondary to ischemic cardiomyopathy (LVEF 42%). Currently euvolemic. Stable CAD (s/p CABG, RCA and Cx stents). Asymptomatic. At this time, patient is considered ACC/AHA CHF stage B. Continue current medications including Entresto 49-51 mg twice daily. ECG completed today and reviewed. Follow up in 4-6 months. [EKG obtained to assist in diagnosis and management of assessed problem(s)] : EKG obtained to assist in diagnosis and management of assessed problem(s)

## 2024-01-11 ENCOUNTER — NON-APPOINTMENT (OUTPATIENT)
Age: 69
End: 2024-01-11

## 2024-01-11 ENCOUNTER — APPOINTMENT (OUTPATIENT)
Dept: CARDIOLOGY | Facility: CLINIC | Age: 69
End: 2024-01-11
Payer: MEDICARE

## 2024-01-11 VITALS — DIASTOLIC BLOOD PRESSURE: 74 MMHG | SYSTOLIC BLOOD PRESSURE: 152 MMHG

## 2024-01-11 VITALS
SYSTOLIC BLOOD PRESSURE: 160 MMHG | HEIGHT: 72 IN | OXYGEN SATURATION: 98 % | HEART RATE: 61 BPM | DIASTOLIC BLOOD PRESSURE: 68 MMHG | BODY MASS INDEX: 31.56 KG/M2 | WEIGHT: 233 LBS

## 2024-01-11 PROCEDURE — 99214 OFFICE O/P EST MOD 30 MIN: CPT

## 2024-01-11 PROCEDURE — 93000 ELECTROCARDIOGRAM COMPLETE: CPT

## 2024-01-15 NOTE — DISCUSSION/SUMMARY
[Coronary Artery Disease] : coronary artery disease [Systolic Heart Failure] : systolic heart failure [Stable] : stable [Compensated] : compensated [Medication Changes Per Orders] : Medication changes are as documented in orders [Hypertension] : hypertension [Low Sodium Diet] : low sodium diet [EKG obtained to assist in diagnosis and management of assessed problem(s)] : EKG obtained to assist in diagnosis and management of assessed problem(s) [FreeTextEntry1] : Currently stable from a cardiac standpoint. Hypertensive today (BP has been mostly 140's mmHg systolic at home since stopping chlorthalidone). Chronic systolic heart failures secondary to ischemic cardiomyopathy (LVEF 42%). Currently euvolemic. Stable CAD (s/p CABG, RCA and Cx stents). Asymptomatic. At this time, patient is considered ACC/AHA CHF stage B. Will increase Entresto to  mg twice daily. Continue all other current medications. ECG completed today and reviewed. Patient to monitor BP readings at home. Follow up in 4-6 months.

## 2024-01-15 NOTE — CARDIOLOGY SUMMARY
[de-identified] : 01/11/24 - sinus rhythm, first degree AV block, LBBB [de-identified] : 12/28/18 (regadenoson MIBI) - small, moderate perfusion defect in basal inferolateral and lateral walls that is predominantly reversible, consistent with infarction with moderate edwina-infarct ischemia, LVEF 42% [de-identified] : 03/30/23 - MAC, calcified AV with normal opening, moderate segmental LV systolic dysfunction, mild diastolic dysfunction, normal RV size and function, LVEF 40-45% 12/28/18 - normal LA, mild to moderate global LV systolic dysfunction, normal RV size and function, RVSP 36 mmHg, LVEF 35% [de-identified] : \par  06/29/04 - CABG x 3 vessels [de-identified] : 08/16/16 (CATH) - mLM 20%, pLAD 50%, mLAD 90%, oCx 80%, mCx 100% ISR with collaterals from D1, mRamus 60%, oRCA 20% ISR, mRCA 30%, patent LIMA-mLAD, LVEDP 14 mmHg 11/15/05 (PCI) - TAXUS stent to oRCA 10/14/03 (PCI) - CYPHER stent to prox/mid Cx 04/21/03 (PCI) - BMS to pCx

## 2024-01-15 NOTE — HISTORY OF PRESENT ILLNESS
[FreeTextEntry1] : Currently doing well. Denies chest pain, shortness of breath or palpitations. Had stopped chlorthalidone due to abnormal labs with kidney function as per patient.

## 2024-01-17 RX ORDER — SACUBITRIL AND VALSARTAN 97; 103 MG/1; MG/1
97-103 TABLET, FILM COATED ORAL TWICE DAILY
Qty: 180 | Refills: 3 | Status: ACTIVE | COMMUNITY
Start: 2023-01-05 | End: 1900-01-01

## 2024-02-23 ENCOUNTER — INPATIENT (INPATIENT)
Facility: HOSPITAL | Age: 69
LOS: 2 days | Discharge: HOME | End: 2024-02-26
Attending: STUDENT IN AN ORGANIZED HEALTH CARE EDUCATION/TRAINING PROGRAM | Admitting: STUDENT IN AN ORGANIZED HEALTH CARE EDUCATION/TRAINING PROGRAM
Payer: MEDICARE

## 2024-02-23 VITALS
DIASTOLIC BLOOD PRESSURE: 72 MMHG | RESPIRATION RATE: 16 BRPM | SYSTOLIC BLOOD PRESSURE: 127 MMHG | OXYGEN SATURATION: 94 % | TEMPERATURE: 98 F | HEART RATE: 65 BPM

## 2024-02-23 DIAGNOSIS — D33.2 BENIGN NEOPLASM OF BRAIN, UNSPECIFIED: Chronic | ICD-10-CM

## 2024-02-23 DIAGNOSIS — R79.89 OTHER SPECIFIED ABNORMAL FINDINGS OF BLOOD CHEMISTRY: ICD-10-CM

## 2024-02-23 DIAGNOSIS — R07.9 CHEST PAIN, UNSPECIFIED: ICD-10-CM

## 2024-02-23 DIAGNOSIS — I50.22 CHRONIC SYSTOLIC (CONGESTIVE) HEART FAILURE: ICD-10-CM

## 2024-02-23 DIAGNOSIS — Z98.89 OTHER SPECIFIED POSTPROCEDURAL STATES: Chronic | ICD-10-CM

## 2024-02-23 DIAGNOSIS — Z29.9 ENCOUNTER FOR PROPHYLACTIC MEASURES, UNSPECIFIED: ICD-10-CM

## 2024-02-23 DIAGNOSIS — I48.92 UNSPECIFIED ATRIAL FLUTTER: ICD-10-CM

## 2024-02-23 DIAGNOSIS — I10 ESSENTIAL (PRIMARY) HYPERTENSION: ICD-10-CM

## 2024-02-23 DIAGNOSIS — Z95.1 PRESENCE OF AORTOCORONARY BYPASS GRAFT: Chronic | ICD-10-CM

## 2024-02-23 DIAGNOSIS — E11.9 TYPE 2 DIABETES MELLITUS WITHOUT COMPLICATIONS: ICD-10-CM

## 2024-02-23 LAB
ADD ON TEST-SPECIMEN IN LAB: SIGNIFICANT CHANGE UP
ALBUMIN SERPL ELPH-MCNC: 3.6 G/DL — SIGNIFICANT CHANGE UP (ref 3.3–5)
ALP SERPL-CCNC: 87 U/L — SIGNIFICANT CHANGE UP (ref 40–120)
ALT FLD-CCNC: 12 U/L — SIGNIFICANT CHANGE UP (ref 4–41)
ANION GAP SERPL CALC-SCNC: 11 MMOL/L — SIGNIFICANT CHANGE UP (ref 7–14)
APTT BLD: 35.5 SEC — SIGNIFICANT CHANGE UP (ref 24.5–35.6)
AST SERPL-CCNC: 12 U/L — SIGNIFICANT CHANGE UP (ref 4–40)
BASOPHILS # BLD AUTO: 0.07 K/UL — SIGNIFICANT CHANGE UP (ref 0–0.2)
BASOPHILS NFR BLD AUTO: 0.7 % — SIGNIFICANT CHANGE UP (ref 0–2)
BILIRUB SERPL-MCNC: 0.5 MG/DL — SIGNIFICANT CHANGE UP (ref 0.2–1.2)
BLD GP AB SCN SERPL QL: NEGATIVE — SIGNIFICANT CHANGE UP
BUN SERPL-MCNC: 26 MG/DL — HIGH (ref 7–23)
CALCIUM SERPL-MCNC: 9.6 MG/DL — SIGNIFICANT CHANGE UP (ref 8.4–10.5)
CHLORIDE SERPL-SCNC: 106 MMOL/L — SIGNIFICANT CHANGE UP (ref 98–107)
CK MB BLD-MCNC: 2.8 % — HIGH (ref 0–2.5)
CK MB CFR SERPL CALC: 3.5 NG/ML — SIGNIFICANT CHANGE UP
CK SERPL-CCNC: 124 U/L — SIGNIFICANT CHANGE UP (ref 30–200)
CO2 SERPL-SCNC: 24 MMOL/L — SIGNIFICANT CHANGE UP (ref 22–31)
CREAT SERPL-MCNC: 1.4 MG/DL — HIGH (ref 0.5–1.3)
EGFR: 54 ML/MIN/1.73M2 — LOW
EOSINOPHIL # BLD AUTO: 0.06 K/UL — SIGNIFICANT CHANGE UP (ref 0–0.5)
EOSINOPHIL NFR BLD AUTO: 0.6 % — SIGNIFICANT CHANGE UP (ref 0–6)
FLUAV AG NPH QL: SIGNIFICANT CHANGE UP
FLUBV AG NPH QL: SIGNIFICANT CHANGE UP
GLUCOSE BLDC GLUCOMTR-MCNC: 195 MG/DL — HIGH (ref 70–99)
GLUCOSE BLDC GLUCOMTR-MCNC: 248 MG/DL — HIGH (ref 70–99)
GLUCOSE SERPL-MCNC: 222 MG/DL — HIGH (ref 70–99)
HCT VFR BLD CALC: 36.8 % — LOW (ref 39–50)
HGB BLD-MCNC: 12.4 G/DL — LOW (ref 13–17)
IANC: 7.33 K/UL — SIGNIFICANT CHANGE UP (ref 1.8–7.4)
IMM GRANULOCYTES NFR BLD AUTO: 0.6 % — SIGNIFICANT CHANGE UP (ref 0–0.9)
LYMPHOCYTES # BLD AUTO: 0.79 K/UL — LOW (ref 1–3.3)
LYMPHOCYTES # BLD AUTO: 8.4 % — LOW (ref 13–44)
MAGNESIUM SERPL-MCNC: 1.6 MG/DL — SIGNIFICANT CHANGE UP (ref 1.6–2.6)
MCHC RBC-ENTMCNC: 29.4 PG — SIGNIFICANT CHANGE UP (ref 27–34)
MCHC RBC-ENTMCNC: 33.7 GM/DL — SIGNIFICANT CHANGE UP (ref 32–36)
MCV RBC AUTO: 87.2 FL — SIGNIFICANT CHANGE UP (ref 80–100)
MONOCYTES # BLD AUTO: 1.04 K/UL — HIGH (ref 0–0.9)
MONOCYTES NFR BLD AUTO: 11.1 % — SIGNIFICANT CHANGE UP (ref 2–14)
NEUTROPHILS # BLD AUTO: 7.33 K/UL — SIGNIFICANT CHANGE UP (ref 1.8–7.4)
NEUTROPHILS NFR BLD AUTO: 78.6 % — HIGH (ref 43–77)
NRBC # BLD: 0 /100 WBCS — SIGNIFICANT CHANGE UP (ref 0–0)
NRBC # FLD: 0 K/UL — SIGNIFICANT CHANGE UP (ref 0–0)
PLATELET # BLD AUTO: 239 K/UL — SIGNIFICANT CHANGE UP (ref 150–400)
POTASSIUM SERPL-MCNC: 4.4 MMOL/L — SIGNIFICANT CHANGE UP (ref 3.5–5.3)
POTASSIUM SERPL-SCNC: 4.4 MMOL/L — SIGNIFICANT CHANGE UP (ref 3.5–5.3)
PROT SERPL-MCNC: 6.1 G/DL — SIGNIFICANT CHANGE UP (ref 6–8.3)
RBC # BLD: 4.22 M/UL — SIGNIFICANT CHANGE UP (ref 4.2–5.8)
RBC # FLD: 13.6 % — SIGNIFICANT CHANGE UP (ref 10.3–14.5)
RH IG SCN BLD-IMP: POSITIVE — SIGNIFICANT CHANGE UP
RSV RNA NPH QL NAA+NON-PROBE: SIGNIFICANT CHANGE UP
SARS-COV-2 RNA SPEC QL NAA+PROBE: SIGNIFICANT CHANGE UP
SODIUM SERPL-SCNC: 141 MMOL/L — SIGNIFICANT CHANGE UP (ref 135–145)
TROPONIN T, HIGH SENSITIVITY RESULT: 161 NG/L — CRITICAL HIGH
TROPONIN T, HIGH SENSITIVITY RESULT: 171 NG/L — CRITICAL HIGH
TSH SERPL-MCNC: 1.34 UIU/ML — SIGNIFICANT CHANGE UP (ref 0.27–4.2)
WBC # BLD: 9.35 K/UL — SIGNIFICANT CHANGE UP (ref 3.8–10.5)
WBC # FLD AUTO: 9.35 K/UL — SIGNIFICANT CHANGE UP (ref 3.8–10.5)

## 2024-02-23 PROCEDURE — 99152 MOD SED SAME PHYS/QHP 5/>YRS: CPT

## 2024-02-23 PROCEDURE — 71045 X-RAY EXAM CHEST 1 VIEW: CPT | Mod: 26

## 2024-02-23 PROCEDURE — 93459 L HRT ART/GRFT ANGIO: CPT | Mod: 26

## 2024-02-23 PROCEDURE — 99223 1ST HOSP IP/OBS HIGH 75: CPT

## 2024-02-23 PROCEDURE — 99285 EMERGENCY DEPT VISIT HI MDM: CPT

## 2024-02-23 RX ORDER — GLUCAGON INJECTION, SOLUTION 0.5 MG/.1ML
1 INJECTION, SOLUTION SUBCUTANEOUS ONCE
Refills: 0 | Status: DISCONTINUED | OUTPATIENT
Start: 2024-02-23 | End: 2024-02-26

## 2024-02-23 RX ORDER — ACETAMINOPHEN 500 MG
650 TABLET ORAL EVERY 6 HOURS
Refills: 0 | Status: DISCONTINUED | OUTPATIENT
Start: 2024-02-23 | End: 2024-02-26

## 2024-02-23 RX ORDER — HEPARIN SODIUM 5000 [USP'U]/ML
4000 INJECTION INTRAVENOUS; SUBCUTANEOUS EVERY 6 HOURS
Refills: 0 | Status: DISCONTINUED | OUTPATIENT
Start: 2024-02-23 | End: 2024-02-23

## 2024-02-23 RX ORDER — ONDANSETRON 8 MG/1
4 TABLET, FILM COATED ORAL EVERY 8 HOURS
Refills: 0 | Status: DISCONTINUED | OUTPATIENT
Start: 2024-02-23 | End: 2024-02-26

## 2024-02-23 RX ORDER — APIXABAN 2.5 MG/1
5 TABLET, FILM COATED ORAL EVERY 12 HOURS
Refills: 0 | Status: DISCONTINUED | OUTPATIENT
Start: 2024-02-23 | End: 2024-02-23

## 2024-02-23 RX ORDER — DEXTROSE 50 % IN WATER 50 %
25 SYRINGE (ML) INTRAVENOUS ONCE
Refills: 0 | Status: DISCONTINUED | OUTPATIENT
Start: 2024-02-23 | End: 2024-02-26

## 2024-02-23 RX ORDER — ASPIRIN/CALCIUM CARB/MAGNESIUM 324 MG
243 TABLET ORAL ONCE
Refills: 0 | Status: COMPLETED | OUTPATIENT
Start: 2024-02-23 | End: 2024-02-23

## 2024-02-23 RX ORDER — MAGNESIUM SULFATE 500 MG/ML
2 VIAL (ML) INJECTION ONCE
Refills: 0 | Status: COMPLETED | OUTPATIENT
Start: 2024-02-23 | End: 2024-02-23

## 2024-02-23 RX ORDER — DEXTROSE 50 % IN WATER 50 %
15 SYRINGE (ML) INTRAVENOUS ONCE
Refills: 0 | Status: DISCONTINUED | OUTPATIENT
Start: 2024-02-23 | End: 2024-02-26

## 2024-02-23 RX ORDER — CLOPIDOGREL BISULFATE 75 MG/1
75 TABLET, FILM COATED ORAL DAILY
Refills: 0 | Status: DISCONTINUED | OUTPATIENT
Start: 2024-02-23 | End: 2024-02-26

## 2024-02-23 RX ORDER — HEPARIN SODIUM 5000 [USP'U]/ML
INJECTION INTRAVENOUS; SUBCUTANEOUS
Qty: 25000 | Refills: 0 | Status: DISCONTINUED | OUTPATIENT
Start: 2024-02-23 | End: 2024-02-23

## 2024-02-23 RX ORDER — ALPRAZOLAM 0.25 MG
1 TABLET ORAL ONCE
Refills: 0 | Status: DISCONTINUED | OUTPATIENT
Start: 2024-02-23 | End: 2024-02-23

## 2024-02-23 RX ORDER — INSULIN LISPRO 100/ML
VIAL (ML) SUBCUTANEOUS EVERY 6 HOURS
Refills: 0 | Status: DISCONTINUED | OUTPATIENT
Start: 2024-02-23 | End: 2024-02-24

## 2024-02-23 RX ORDER — NITROGLYCERIN 6.5 MG
0.4 CAPSULE, EXTENDED RELEASE ORAL ONCE
Refills: 0 | Status: COMPLETED | OUTPATIENT
Start: 2024-02-23 | End: 2024-02-23

## 2024-02-23 RX ORDER — LANOLIN ALCOHOL/MO/W.PET/CERES
3 CREAM (GRAM) TOPICAL AT BEDTIME
Refills: 0 | Status: DISCONTINUED | OUTPATIENT
Start: 2024-02-23 | End: 2024-02-26

## 2024-02-23 RX ORDER — FUROSEMIDE 40 MG
40 TABLET ORAL ONCE
Refills: 0 | Status: COMPLETED | OUTPATIENT
Start: 2024-02-23 | End: 2024-02-23

## 2024-02-23 RX ORDER — CARVEDILOL PHOSPHATE 80 MG/1
25 CAPSULE, EXTENDED RELEASE ORAL EVERY 12 HOURS
Refills: 0 | Status: DISCONTINUED | OUTPATIENT
Start: 2024-02-23 | End: 2024-02-26

## 2024-02-23 RX ORDER — ASPIRIN/CALCIUM CARB/MAGNESIUM 324 MG
81 TABLET ORAL DAILY
Refills: 0 | Status: DISCONTINUED | OUTPATIENT
Start: 2024-02-24 | End: 2024-02-24

## 2024-02-23 RX ORDER — AMLODIPINE BESYLATE 2.5 MG/1
10 TABLET ORAL DAILY
Refills: 0 | Status: DISCONTINUED | OUTPATIENT
Start: 2024-02-23 | End: 2024-02-26

## 2024-02-23 RX ORDER — ALPRAZOLAM 0.25 MG
0.5 TABLET ORAL THREE TIMES A DAY
Refills: 0 | Status: DISCONTINUED | OUTPATIENT
Start: 2024-02-23 | End: 2024-02-26

## 2024-02-23 RX ORDER — PANTOPRAZOLE SODIUM 20 MG/1
40 TABLET, DELAYED RELEASE ORAL
Refills: 0 | Status: DISCONTINUED | OUTPATIENT
Start: 2024-02-24 | End: 2024-02-26

## 2024-02-23 RX ORDER — DEXTROSE 50 % IN WATER 50 %
12.5 SYRINGE (ML) INTRAVENOUS ONCE
Refills: 0 | Status: DISCONTINUED | OUTPATIENT
Start: 2024-02-23 | End: 2024-02-26

## 2024-02-23 RX ORDER — INSULIN GLARGINE 100 [IU]/ML
48 INJECTION, SOLUTION SUBCUTANEOUS AT BEDTIME
Refills: 0 | Status: DISCONTINUED | OUTPATIENT
Start: 2024-02-23 | End: 2024-02-26

## 2024-02-23 RX ORDER — INFLUENZA VIRUS VACCINE 15; 15; 15; 15 UG/.5ML; UG/.5ML; UG/.5ML; UG/.5ML
0.7 SUSPENSION INTRAMUSCULAR ONCE
Refills: 0 | Status: DISCONTINUED | OUTPATIENT
Start: 2024-02-23 | End: 2024-02-26

## 2024-02-23 RX ORDER — SODIUM CHLORIDE 9 MG/ML
1000 INJECTION, SOLUTION INTRAVENOUS
Refills: 0 | Status: DISCONTINUED | OUTPATIENT
Start: 2024-02-23 | End: 2024-02-26

## 2024-02-23 RX ORDER — FAMOTIDINE 10 MG/ML
20 INJECTION INTRAVENOUS ONCE
Refills: 0 | Status: COMPLETED | OUTPATIENT
Start: 2024-02-23 | End: 2024-02-23

## 2024-02-23 RX ORDER — ATORVASTATIN CALCIUM 80 MG/1
80 TABLET, FILM COATED ORAL AT BEDTIME
Refills: 0 | Status: DISCONTINUED | OUTPATIENT
Start: 2024-02-23 | End: 2024-02-26

## 2024-02-23 RX ORDER — SIMVASTATIN 20 MG/1
40 TABLET, FILM COATED ORAL AT BEDTIME
Refills: 0 | Status: DISCONTINUED | OUTPATIENT
Start: 2024-02-23 | End: 2024-02-23

## 2024-02-23 RX ADMIN — Medication 40 MILLIGRAM(S): at 20:40

## 2024-02-23 RX ADMIN — Medication 243 MILLIGRAM(S): at 10:30

## 2024-02-23 RX ADMIN — Medication 1 MILLIGRAM(S): at 21:20

## 2024-02-23 RX ADMIN — Medication 0.4 MILLIGRAM(S): at 20:27

## 2024-02-23 RX ADMIN — FAMOTIDINE 20 MILLIGRAM(S): 10 INJECTION INTRAVENOUS at 10:10

## 2024-02-23 RX ADMIN — Medication 25 GRAM(S): at 13:56

## 2024-02-23 RX ADMIN — HEPARIN SODIUM 1000 UNIT(S)/HR: 5000 INJECTION INTRAVENOUS; SUBCUTANEOUS at 12:31

## 2024-02-23 NOTE — H&P ADULT - PROBLEM SELECTOR PLAN 3
-  last Cr 2016 1.19  - currently 1.4   - hold Entresto in setting borderline BP and plan for angiogram today   - monitor I/O's

## 2024-02-23 NOTE — H&P ADULT - NSHPPHYSICALEXAM_GEN_ALL_CORE
Vital Signs Last 24 Hrs  T(C): 36.8 (23 Feb 2024 15:26), Max: 36.9 (23 Feb 2024 10:52)  T(F): 98.3 (23 Feb 2024 15:26), Max: 98.4 (23 Feb 2024 10:52)  HR: 68 (23 Feb 2024 15:26) (64 - 70)  BP: 107/85 (23 Feb 2024 15:26) (107/85 - 134/70)  BP(mean): 88 (23 Feb 2024 12:52) (70 - 88)  RR: 18 (23 Feb 2024 15:26) (16 - 18)  SpO2: 95% (23 Feb 2024 15:26) (94% - 95%)    Parameters below as of 23 Feb 2024 15:26  Patient On (Oxygen Delivery Method): nasal cannula  O2 Flow (L/min): 2      PHYSICAL EXAM:  GENERAL: NAD, well-developed  HEAD:  Atraumatic, Normocephalic  EYES: EOMI, PERRLA, conjunctiva and sclera clear  NECK: Supple, No JVD  CHEST/LUNG: Clear to auscultation bilaterally; No wheeze  HEART: Regular rate and rhythm; No murmurs, rubs, or gallops  ABDOMEN: Soft, Nontender, Nondistended; Bowel sounds present  EXTREMITIES:  2+ Peripheral Pulses, No clubbing, cyanosis, or edema  PSYCH: AAOx3  NEUROLOGY: non-focal  SKIN: No rashes or lesions

## 2024-02-23 NOTE — H&P ADULT - PROBLEM SELECTOR PLAN 5
- monitor FS QAC/HS  - c/w lantus 48 U sq qHS and ademalog 12 U sq AC  - monitor FS   - check A1c - monitor FS QAC/HS  - c/w lantus 48 U sq qHS and ISS for now as NPO for cath q6   - monitor FS   - check A1c  - resume mealtime insulin post cath if tolerating diet

## 2024-02-23 NOTE — ED PROVIDER NOTE - OBJECTIVE STATEMENT
68-year-old male with PMH CAD status post PCI, CABG 2004, insulin-dependent DM, HTN, HLD presents for evaluation of chest pain and palpitations.  Patient states last week had a few episodes where he felt like his chest got a little heavy, not clearly exertional, short-lived, did not prevent him from doing daily activities.  Thought it might be GERD.  Last night patient was trying to sleep when he suddenly felt his chest got heavy, felt like his heart was fluttering, got up and sat in a chair, felt his chest pain went from a 3/10 to an 8/10 briefly but then returned.  Pain radiated to his back both left and right chest, and neck.  Currently pain has improved, no history of A-fib, follows with Dr. Ozzy Read.  Not sure when his last stress was, but sees his cardiologist regularly.  No SOB, AMES, LE edema, ABD pain, nausea vomiting.  +cough, no fever.

## 2024-02-23 NOTE — H&P ADULT - TIME BILLING
Reviewed lab data, radiology results, consultants' recommendations, documentation in [place], discussed with family, ACP, interdisciplinary staff and/or intervention were performed.

## 2024-02-23 NOTE — H&P ADULT - NSICDXPASTSURGICALHX_GEN_ALL_CORE_FT
PAST SURGICAL HISTORY:  Benign brain tumor removed from frontal lobe, meningioma    H/O right inguinal hernia repair     History of incisional hernia repair     S/P CABG x 3 2003 at [facility], Dr Sutton

## 2024-02-23 NOTE — CONSULT NOTE ADULT - SUBJECTIVE AND OBJECTIVE BOX
CARDIOLOGY FELLOW CONSULT NOTE    HPI:      PMHx:   HTN (hypertension)    Hyperlipidemia    Diabetes mellitus type 2 in obese    CAD (coronary artery disease)    Stented coronary artery    Morbidly obese    Calcaneal fracture        PSHx:   S/P CABG x 3    Benign brain tumor    History of incisional hernia repair    H/O right inguinal hernia repair        Allergies:  penicillins (Unknown)      Home Meds:    Current Medications:       FAMILY HISTORY:      ROS:  CV: chest pain (-), palpitation (-), orthopnea (-), PND (-), edema (-)  PULM: SOB (-), cough (-), wheezing (-), hemoptysis (-).   CONST: fever (-), chills (-) or fatigue (-)  GI: abdominal distension (-), abdominal pain (-) , nausea/vomiting (-), hematemesis, (-), melena (-), hematochezia (-)  : dysuria (-), frequency (-), hematuria (-).   NEURO: numbness (-), weakness (-), dizziness (-)  SKIN: itching (-), rash (-)  HEENT:  visual changes (-); vertigo or throat pain (-);  neck stiffness (-)     Physical Exam:  T(F): 98.4 (-), Max: 98.4 (-)  HR: 64 (-) (64 - 65)  BP: 116/51 (-) (116/51 - 127/72)  RR: 18 (-)  SpO2: 95% (-)    GENERAL: NAD  NECK: Supple, No JVD.  CHEST/LUNG: Clear to auscultation bilaterally; No rales, rhonchi, wheezing, or rubs. Speaking in full sentences  HEART: Irregularly irregular; No murmurs, rubs, or gallops.  ABDOMEN: Bowel sounds present; Soft, Nontender, Nondistended.   EXTREMITIES:  2+ Peripheral Pulses, brisk capillary refill. No clubbing, cyanosis, or edema.  SKIN: No rashes or lesions.    Labs: Personally reviewed                        12.4   9.35  )-----------( 239      ( 2024 10:10 )             36.8         141  |  106  |  26<H>  ----------------------------<  222<H>  4.4   |  24  |  1.40<H>    Ca    9.6      2024 10:10  Mg     1.60         TPro  6.1  /  Alb  3.6  /  TBili  0.5  /  DBili  x   /  AST  12  /  ALT  12  /  AlkPhos  87      PTT - ( 2024 10:10 )  PTT:35.5 sec    CARDIAC MARKERS ( 2024 10:10 )  171 ng/L / x     / x     / x     / x     / x        Thyroid Stimulating Hormone, Serum: 1.34 uIU/mL ( @ 10:10)    ECG: Personally reviewed    Echo: Personally reviewed    Stress Test: 18 (regadenoson MIBI) - small, moderate perfusion defect in basal inferolateral and lateral walls that is predominantly reversible, consistent with infarction with moderate edwina-infarct ischemia, LVEF 42%      Echo: 23 - MAC, calcified AV with normal opening, moderate segmental LV systolic dysfunction, mild diastolic dysfunction, normal RV size and function, LVEF 40-45%  18 - normal LA, mild to moderate global LV systolic dysfunction, normal RV size and function, RVSP 36 mmHg, LVEF 35%      Cardiac Cath/PCI: 16 (CATH) - mLM 20%, pLAD 50%, mLAD 90%, oCx 80%, mCx 100% ISR with collaterals from D1, mRamus 60%, oRCA 20% ISR, mRCA 30%, patent LIMA-mLAD, LVEDP 14 mmHg  11/15/05 (PCI) - TAXUS stent to oRCA  10/14/03 (PCI) - CYPHER stent to prox/mid Cx  03 (PCI) - BMS to pCx      Cardiac Sur04 - CABG x 3 vessels    Cath: Personally reviewed    CXR: Personally reviewed   CARDIOLOGY FELLOW CONSULT NOTE    HPI:    68 y.o M with hx of CAD s/p stents to prox and mid LCx () and eventual CABG x 3 vessels in , with subsequent PCI with ARMAND to RCA () and known mLCx 100% IRS with collaterals from D1 from [facility] from 2016, who also has a hx of HFrEF (EF 40 - 45%), HTN, HLD and DM2 who presented to the ED chest pain. 1 week ago, patient reported an episode of exertional chest pain that improved with rest and was subsequently able to perform ADLs without angina, however he had another episode of substernal chest pain radiating to his back starting last night, patient radiated to his back and his throat. Pain was associated with mild SOB and palpitations prompting his visit to the ED. At the time of evaluation patient was chest pan free without SOB. He deneis any lower extremity edema,  orthopnea or PND.     PMHx:   HTN (hypertension)  Hyperlipidemia  Diabetes mellitus type 2 in obese  CAD (coronary artery disease)  Stented coronary artery  Morbidly obese  Calcaneal fracture    PSHx:   S/P CABG x 3  Benign brain tumor  History of incisional hernia repair  H/O right inguinal hernia repair    Allergies:  penicillins (Unknown)    FAMILY HISTORY:    ROS:  CV: chest pain (-), palpitation (-), orthopnea (-), PND (-), edema (-)  PULM: SOB (-), cough (-), wheezing (-), hemoptysis (-).   CONST: fever (-), chills (-) or fatigue (-)  GI: abdominal distension (-), abdominal pain (-) , nausea/vomiting (-), hematemesis, (-), melena (-), hematochezia (-)  : dysuria (-), frequency (-), hematuria (-).   NEURO: numbness (-), weakness (-), dizziness (-)  SKIN: itching (-), rash (-)  HEENT:  visual changes (-); vertigo or throat pain (-);  neck stiffness (-)     Physical Exam:  T(F): 98.4 (-), Max: 98.4 (-)  HR: 64 (-) (64 - 65)  BP: 116/51 (-) (116/51 - 127/72)  RR: 18 (-)  SpO2: 95% ()    GENERAL: NAD  NECK: Supple, No JVD.  CHEST/LUNG: Clear to auscultation bilaterally; No rales, rhonchi, wheezing, or rubs. Speaking in full sentences  HEART: Irregularly irregular; No murmurs, rubs, or gallops.  ABDOMEN: Bowel sounds present; Soft, Nontender, Nondistended.   EXTREMITIES:  2+ Peripheral Pulses, brisk capillary refill. No clubbing, cyanosis, or edema.  SKIN: No rashes or lesions.    Labs: Personally reviewed                        12.4   9.35  )-----------( 239      ( 2024 10:10 )             36.8         141  |  106  |  26<H>  ----------------------------<  222<H>  4.4   |  24  |  1.40<H>    Ca    9.6      2024 10:10  Mg     1.60         TPro  6.1  /  Alb  3.6  /  TBili  0.5  /  DBili  x   /  AST  12  /  ALT  12  /  AlkPhos  87      PTT - ( 2024 10:10 )  PTT:35.5 sec    CARDIAC MARKERS ( 2024 10:10 )  171 ng/L / x     / x     / x     / x     / x        Thyroid Stimulating Hormone, Serum: 1.34 uIU/mL ( @ 10:10)    ECG: Personally reviewed    Echo: Personally reviewed  TTE 2023  Ejection Fraction (Visual Estimate): 40-45 %  ------------------------------------------------------------------------  OBSERVATIONS:  Mitral Valve: Mitral annular calcification, otherwise  normal mitral valve.  Aortic Root: Normal aortic root.  Aortic Valve: Calcified trileaflet aortic valve with normal  opening.  Left Atrium: Normal left atrium.  LA volume index = 31  cc/m2.  Left Ventricle: Endocardial visualization enhanced with  intravenous injection of echo contrast (Definity). Moderate  segmental left ventricular systolic dysfunctin. The  inferolateral wall is dyskinetic. Septal motion is  consistentwith LBBB. Normal left ventricular internal  dimensions and wall thicknesses. Mild diastolic dysfunction  (Stage I).  Right Heart: Normal right atrium. Normal right ventricular  size and function. Normal tricuspid valve. Normal pulmonic  valve. Mild pulmonic regurgitation.  Pericardium/PleuraNormal pericardium with no pericardial  effusion.  ------------------------------------------------------------------------  CONCLUSIONS:  1. Calcified trileaflet aortic valve with normal opening.  2. Endocardial visualization enhanced with intravenous  injection of echo contrast (Definity). Moderate segmental  left ventricular systolic dysfunctin. The inferolateral  wall is dyskinetic. Septal motion is consistent with LBBB.  3. Normal right ventricular size and function.      Stress Test: 18 (regadenoson MIBI) - small, moderate perfusion defect in basal inferolateral and lateral walls that is predominantly reversible, consistent with infarction with moderate edwina-infarct ischemia, LVEF 42%      Echo: 23 - MAC, calcified AV with normal opening, moderate segmental LV systolic dysfunction, mild diastolic dysfunction, normal RV size and function, LVEF 40-45%  18 - normal LA, mild to moderate global LV systolic dysfunction, normal RV size and function, RVSP 36 mmHg, LVEF 35%      Cardiac Cath/PCI: 16 (CATH) - mLM 20%, pLAD 50%, mLAD 90%, oCx 80%, mCx 100% ISR with collaterals from D1, mRamus 60%, oRCA 20% ISR, mRCA 30%, patent LIMA-mLAD, LVEDP 14 mmHg  11/15/05 (PCI) - TAXUS stent to oRCA  10/14/03 (PCI) - CYPHER stent to prox/mid Cx  03 (PCI) - BMS to pCx      Cardiac Sur04 - CABG x 3 vessels    Cath: Personally reviewed    CXR: Personally reviewed

## 2024-02-23 NOTE — CONSULT NOTE ADULT - ATTENDING COMMENTS
personally saw and examined pt  labs and vitals reviewed  agree with above assessment and plan  extensive cardiac hx  now with cp  would r/o acs  also with afib ?new, may be etiology of symptoms  cont tele  cont bb  agree with a/c given elevated chadsvasc  currently pt comfortable appearing, euvolemic, no active cp  will follow with you

## 2024-02-23 NOTE — ED PROVIDER NOTE - ATTENDING CONTRIBUTION TO CARE
HPI: 68-year-old male with PMH CAD status post PCI, CABG 2004, insulin-dependent DM, HTN, HLD presents for evaluation of chest pain and palpitations.  Patient states last week had a few episodes where he felt like his chest got a little heavy, not clearly exertional, short-lived, did not prevent him from doing daily activities.  Thought it might be GERD.  Last night patient was trying to sleep when he suddenly felt his chest got heavy, felt like his heart was fluttering, got up and sat in a chair, felt his chest pain went from a 3/10 to an 8/10 briefly but then returned.  Pain radiated to his back both left and right chest, and neck.  Currently pain has improved, no history of A-fib, follows with Dr. Ozzy Velazco.  Not sure when his last stress was, but sees his cardiologist regularly.  No SOB, AMES, LE edema, ABD pain, nausea vomiting.  +cough, no fever.    EXAM: awake, alert, speaking full sentences, not diaphoretic, heart IRREG rate and rhythm, no pitting edema BLE, abd soft nontender.     MDM: pt with multiple medical problems that presents with chest pain x 1 wk with exertion, but has had intermittent chest discomfort and occasional SOB since. Pt thinks possible GERD but never had before. Given pt risk factors and symptoms concern for ACS is high. other considerations are GERD vs PNA vs viral illness vs MSK pain although all less likely. Pt has multiple risk factors, EKG taken in triage show LBBB which per my chart review from pt seeing his cards 1 month ago they knew about this but no mention in past about Afib which is also on EKG today. Concern is for possible ischemic even 1 wk ago when pt had chest pain with exertion and since has had palpitations and intermittent chest discomfort from possible ongoing ischemic issues. No acute STEMI at this time. Will likely admit. Will also callpt private cardiologist Dr Pranay velazco.

## 2024-02-23 NOTE — ED ADULT NURSE NOTE - NSFALLUNIVINTERV_ED_ALL_ED
Bed/Stretcher in lowest position, wheels locked, appropriate side rails in place/Call bell, personal items and telephone in reach/Instruct patient to call for assistance before getting out of bed/chair/stretcher/Non-slip footwear applied when patient is off stretcher/[place] to call system/Physically safe environment - no spills, clutter or unnecessary equipment/Purposeful proactive rounding/Room/bathroom lighting operational, light cord in reach

## 2024-02-23 NOTE — ED ADULT NURSE NOTE - OBJECTIVE STATEMENT
pt received to trauma room C. pt is AxO 3 and ambulatory. pt c/o chest discomfort, with pain radiating to his back and epigastric area x 1 weeks. pt endorses the pain was worse this morning, and took a 81mg tablet of aspirin. Pt endorses sensation of palpation, and SOB last night. pt respirations are even and unlabored on room air. pt abdomen is soft, nontender on palpation, and nondistended. pt endorses hx of stents, DM type 2, and use of Plavix. pt shows A.fibb on the tele monitor. MD aware at bedside. pt placed on Zoll. Pt has a 20G to the left hand. pt labs obtained and sent to the lab. Pt medicated as prescribed. Plan of care ongoing.

## 2024-02-23 NOTE — ED ADULT TRIAGE NOTE - CHIEF COMPLAINT QUOTE
Pt c/o intermittent chest discomfort, radiating to back x 1 week. Reports worse last night. Endorsing palpitations, and shortness of breath last night. Respirations even and unlabored. MD Read is cardiologist. phx: cardiac stent on plavix, DM1, blood glucose: 206 Pt c/o intermittent chest discomfort, radiating to back x 1 week. Reports worse last night. Endorsing palpitations, and shortness of breath last night. Respirations even and unlabored. MD Read is cardiologist. phx: cardiac stent on plavix, DM2, blood glucose: 206

## 2024-02-23 NOTE — H&P ADULT - PROBLEM SELECTOR PLAN 2
- TTE with normal MV   - EKG with aflutter  - [id number] score 4   - rate control with Coreg  - heparin drip as above - 3/23 TTE with normal MV   - EKG with aflutter  - [id number] score 4   - rate control with Coreg  - heparin drip as above

## 2024-02-23 NOTE — ED PROVIDER NOTE - PROGRESS NOTE DETAILS
KERRI: Patient has no current chest pain troponin is 171.  Creatinine is 1.40.  Patient and wife are made aware of this condition.  Patient placed on the ZOLL pads as he has had A-fib at a slow rate of 60s with some cardiac pauses.  Patient and wife are amenable to plan of being admitted but in the meantime will consult cards for recommendations and likely admission. MANNING: pt still without chest pain, pending repeat trop, EKG repeat shows RBBB and aflutter 4:1. Will continue to monitor. Shiva PGY3: Repeat Trop 161, patient remains comfortable on monitor.  Spoke to cardiology fellow who recommends medicine admission for continued telemetry and cardiac evaluation.  Dr. Read to see patient but no immediate plan to take patient to cath.  Recommends echo and to continue heparin drip.

## 2024-02-23 NOTE — ED PROVIDER NOTE - PHYSICAL EXAMINATION
CONSTITUTIONAL: NAD  SKIN: Warm  HEAD: NCAT  EYES: NL inspection  ENT: MMM  NECK: Supple  CARD: irregular rhythm, no acute murmurs appreciated  RESP: CTAB  ABD: S/NT no R/G  EXT: no LE pitting edema  NEURO: Grossly non-focal   PSYCH: Cooperative, appropriate.

## 2024-02-23 NOTE — ED ADULT NURSE NOTE - CHIEF COMPLAINT QUOTE
Pt c/o intermittent chest discomfort, radiating to back x 1 week. Reports worse last night. Endorsing palpitations, and shortness of breath last night. Respirations even and unlabored. MD Read is cardiologist. phx: cardiac stent on plavix, DM2, blood glucose: 206

## 2024-02-23 NOTE — H&P ADULT - NSHPREVIEWOFSYSTEMS_GEN_ALL_CORE
Review of Systems:   CONSTITUTIONAL: No fever, weight loss, or fatigue  EYES: No eye pain, visual disturbances, or discharge  ENMT:  No difficulty hearing, tinnitus, vertigo; No sinus or throat pain  NECK: No pain or stiffness  BREASTS: No pain, masses, or nipple discharge  RESPIRATORY: No cough, wheezing, chills or hemoptysis; No shortness of breath  CARDIOVASCULAR: No, dizziness, or leg swelling + chest pain/palpitations   GASTROINTESTINAL: No abdominal or epigastric pain. No nausea, vomiting, or hematemesis; No diarrhea or constipation. No melena or hematochezia.  GENITOURINARY: No dysuria, frequency, hematuria, or incontinence  NEUROLOGICAL: No headaches, memory loss, loss of strength, numbness, or tremors  SKIN: No itching, burning, rashes, or lesions   LYMPH NODES: No enlarged glands  ENDOCRINE: No heat or cold intolerance; No hair loss  MUSCULOSKELETAL: No joint pain or swelling; No muscle, back, or extremity pain  PSYCHIATRIC: No depression, anxiety, mood swings, or difficulty sleeping  HEME/LYMPH: No easy bruising, or bleeding gums  ALLERGY AND IMMUNOLOGIC: No hives or eczema Review of Systems:   CONSTITUTIONAL: No fever, weight loss, or fatigue  EYES: No eye pain, visual disturbances, or discharge  ENMT:  No difficulty hearing, tinnitus, vertigo; No sinus or throat pain  NECK: No pain or stiffness  BREASTS: No pain, masses, or nipple discharge  RESPIRATORY: No cough, wheezing, chills or hemoptysis;   CARDIOVASCULAR: No, dizziness, or leg swelling + chest pain/palpitations +    GASTROINTESTINAL: No abdominal or epigastric pain. No nausea, vomiting, or hematemesis; No diarrhea or constipation. No melena or hematochezia.  GENITOURINARY: No dysuria, frequency, hematuria, or incontinence  NEUROLOGICAL: No headaches, memory loss, loss of strength, numbness, or tremors  SKIN: No itching, burning, rashes, or lesions   LYMPH NODES: No enlarged glands  ENDOCRINE: No heat or cold intolerance; No hair loss  MUSCULOSKELETAL: No joint pain or swelling; No muscle, back, or extremity pain  PSYCHIATRIC: No depression, anxiety, mood swings, or difficulty sleeping  HEME/LYMPH: No easy bruising, or bleeding gums  ALLERGY AND IMMUNOLOGIC: No hives or eczema

## 2024-02-23 NOTE — H&P ADULT - PROBLEM SELECTOR PLAN 1
- EKG with RBBB   - trop x  2; 171--161  - d-d-adeline neg for age   - Concern for ACS   - already on Plavix and ASA and BB  - start high dose statin   - heparin drip   - check lipid panel   - cath today

## 2024-02-23 NOTE — ED ADULT NURSE NOTE - NSICDXPASTSURGICALHX_GEN_ALL_CORE_FT
PAST SURGICAL HISTORY:  Benign brain tumor removed from frontal lobe, meningioma    H/O right inguinal hernia repair     History of incisional hernia repair     S/P CABG x 3 2003 at [facility], Dr Sutton

## 2024-02-23 NOTE — H&P ADULT - PROBLEM SELECTOR PLAN 4
- Prior TTE- [id number]%; LBBB  - c/w ASA/Coreg/ hold entresto as above - Prior TTE- GQSH37-79%; LBBB  - CXR with interstitial markings without overt SOB/JVD; notes cough x 2 weeks; check RVP   - c/w ASA/Coreg/ hold entresto as above

## 2024-02-23 NOTE — CONSULT NOTE ADULT - ASSESSMENT
68 y.o M with hx of CAD s/p stents to prox and mid LCx (2003) and eventual CABG x 3 vessels in 2004, with subsequent PCI with ARMAND to RCA (2005) and known mLCx 100% IRS with collaterals from D1 from [facility] from 2016, who also has a hx of HFrEF (EF 40 - 45%), HTN, HLD and DM2 who presented to the ED chest pain. ECG showed new atrial fibrillation with a known LBBB.     1. Chest pain   #Troponin elevation   - Hs- troponin elevation may be secondary to new a.fib in the setting of a cardiac substrate with significant coronary disease with multiple interventions   - Given his risk profile, would also like to rule out ischemic disease  - Trend trops with CK/CMKMB  - Repeat ECG   - Continue home aspirin 81mg daily and plavix 75mg daily    2. Atrial Fibrillation ([id number] = 5 )   - Resume home coreg 25mg BID currently rate controlled  - Can start heparin drip in the meantime with   - Keep K > 4 and Mg >2  - Telemetry monitoring   68 y.o M with hx of CAD s/p stents to prox and mid LCx (2003) and eventual CABG x 3 vessels in 2004, with subsequent PCI with ARMAND to RCA (2005) and known mLCx 100% IRS with collaterals from D1 from [facility] from 2016, who also has a hx of HFrEF (EF 40 - 45%), HTN, HLD and DM2 who presented to the ED chest pain. ECG showed new atrial fibrillation with a known LBBB.     1. Chest pain   #Troponin elevation   - Hs- troponin elevation may be secondary to new a.fib in the setting of a cardiac substrate with significant coronary disease with multiple interventions   - Given his risk profile, would also like to rule out ischemic disease  - Trend trops with CK/CMKMB  - Repeat ECG   - Continue home aspirin 81mg daily and plavix 75mg daily  - Repeat TTE    2. Atrial Fibrillation ([id number] = 5 )   - Resume home coreg 25mg BID currently rate controlled  - Can start heparin drip in the meantime   - Eventual plan to start eliquis 5mg BID in place or heparin and stop the aspirin so patient is not on triple therapy  - Keep K > 4 and Mg >2  - Telemetry monitoring    3. HFrE EF 40-45%  - Does not appear to be in clinical heart failure  - Coreg 25mg BID  - Entresto 97/103mg BID

## 2024-02-23 NOTE — H&P ADULT - HISTORY OF PRESENT ILLNESS
68 y.o M with hx of CAD s/p stents to prox and mid LCx (2003) and eventual CABG x 3 vessels in 2004, with subsequent PCI with ARMAND to RCA (2005) and known mLCx 100% IRS with collaterals from D1 from [facility] from 2016, who also has a hx of HFrEF (EF 40 - 45%), HTN, HLD and DM2 p/w chest pain, Pt noted approx 2 weeks prior to admission he noted epigastric CP with associated burning in the throat  he presumed it was refulx stopped drinking coffee. He again noted LT sided CP  w/ AMES 1 week ago when he was walking from car to his house which was relieved with rest. Last evening at approximately 1 AM he noted that he had LT sided chest pain with radiation to back and with associated palpitations.

## 2024-02-23 NOTE — ED PROVIDER NOTE - NSICDXPASTSURGICALHX_GEN_ALL_CORE_FT
PAST SURGICAL HISTORY:  Benign brain tumor removed from frontal lobe, meningioma    H/O right inguinal hernia repair     History of incisional hernia repair     S/P CABG x 3 2003 at [facility], Dr Sutton

## 2024-02-23 NOTE — H&P ADULT - ASSESSMENT
69 yo M w/ hx CAD s/p stent CABG, HTN, DM on insulin p/w chest pain. + exertional angina and new onset aflutter

## 2024-02-23 NOTE — H&P ADULT - NSHPLABSRESULTS_GEN_ALL_CORE
12.4   9.35  )-----------( 239      ( 23 Feb 2024 10:10 )             36.8       02-23    141  |  106  |  26<H>  ----------------------------<  222<H>  4.4   |  24  |  1.40<H>    Ca    9.6      23 Feb 2024 10:10  Mg     1.60     02-23    TPro  6.1  /  Alb  3.6  /  TBili  0.5  /  DBili  x   /  AST  12  /  ALT  12  /  AlkPhos  87  02-23      CAPILLARY BLOOD GLUCOSE      POCT Blood Glucose.: 206 mg/dL (23 Feb 2024 09:12)      Urinalysis Basic - ( 23 Feb 2024 10:10 )    Color: x / Appearance: x / SG: x / pH: x  Gluc: 222 mg/dL / Ketone: x  / Bili: x / Urobili: x   Blood: x / Protein: x / Nitrite: x   Leuk Esterase: x / RBC: x / WBC x   Sq Epi: x / Non Sq Epi: x / Bacteria: x        PTT - ( 23 Feb 2024 10:10 )  PTT:35.5 sec    EKG - aflutter rate controlled RBBB     Radiology: < from: Xray Chest 1 View AP/PA (02.23.24 @ 10:25) >      IMPRESSION:    Prominent perihilar interstitial markings with small left-sided pleural   effusion. Adjacent atelectasis of the left lower lobe.    < end of copied text >  -< from: TTE with Doppler (w/Cont) (03.30.23 @ 08:42) >      CONCLUSIONS:  1. Calcified trileaflet aortic valve with normal opening.  2. Endocardial visualization enhanced with intravenous  injection of echo contrast (Definity). Moderate segmental  left ventricular systolic dysfunctin. The inferolateral  wall is dyskinetic. Septal motion is consistent with LBBB.  3. Normal right ventricular size and function.  ------------------------------------------------------------------------    < end of copied text >

## 2024-02-23 NOTE — PATIENT PROFILE ADULT - FALL HARM RISK - UNIVERSAL INTERVENTIONS
Bed in lowest position, wheels locked, appropriate side rails in place/Call bell, personal items and telephone in reach/Instruct patient to call for assistance before getting out of bed or chair/Non-slip footwear when patient is out of bed/[place] to call system/Physically safe environment - no spills, clutter or unnecessary equipment/Purposeful Proactive Rounding/Room/bathroom lighting operational, light cord in reach

## 2024-02-23 NOTE — ED PROVIDER NOTE - CLINICAL SUMMARY MEDICAL DECISION MAKING FREE TEXT BOX
[facility] PGY3: 68-year-old male with PMH CAD status post PCI, CABG 2004, insulin-dependent DM, HTN, HLD presents for evaluation of chest pain and palpitations, had episode of chest pain last week that and now presenting with another episode worse and new afib on labs. Plan for labs, trop, BNP. Took asa 81 at 7am. Currently pain improved.     Differential Diagnosis includes but not limited to ACS vs acute arrythmia vs delayed presentation of cardiac event. Cardiac monitor, aspirin, labs, trop, cxr. Cards consult, to be admitted.

## 2024-02-23 NOTE — ED PROVIDER NOTE - NSICDXPASTMEDICALHX_GEN_ALL_CORE_FT
PAST MEDICAL HISTORY:  CAD (coronary artery disease)     Calcaneal fracture left    Diabetes mellitus type 2 in obese     HTN (hypertension)     Hyperlipidemia     Morbidly obese     Stented coronary artery

## 2024-02-24 ENCOUNTER — TRANSCRIPTION ENCOUNTER (OUTPATIENT)
Age: 69
End: 2024-02-24

## 2024-02-24 DIAGNOSIS — I48.0 PAROXYSMAL ATRIAL FIBRILLATION: ICD-10-CM

## 2024-02-24 LAB
ADD ON TEST-SPECIMEN IN LAB: SIGNIFICANT CHANGE UP
ANION GAP SERPL CALC-SCNC: 16 MMOL/L — HIGH (ref 7–14)
APTT BLD: 95.7 SEC — HIGH (ref 24.5–35.6)
BUN SERPL-MCNC: 35 MG/DL — HIGH (ref 7–23)
CALCIUM SERPL-MCNC: 9.3 MG/DL — SIGNIFICANT CHANGE UP (ref 8.4–10.5)
CHLORIDE SERPL-SCNC: 104 MMOL/L — SIGNIFICANT CHANGE UP (ref 98–107)
CO2 SERPL-SCNC: 21 MMOL/L — LOW (ref 22–31)
CREAT SERPL-MCNC: 1.58 MG/DL — HIGH (ref 0.5–1.3)
EGFR: 47 ML/MIN/1.73M2 — LOW
GLUCOSE BLDC GLUCOMTR-MCNC: 154 MG/DL — HIGH (ref 70–99)
GLUCOSE BLDC GLUCOMTR-MCNC: 204 MG/DL — HIGH (ref 70–99)
GLUCOSE BLDC GLUCOMTR-MCNC: 257 MG/DL — HIGH (ref 70–99)
GLUCOSE BLDC GLUCOMTR-MCNC: 280 MG/DL — HIGH (ref 70–99)
GLUCOSE BLDC GLUCOMTR-MCNC: 328 MG/DL — HIGH (ref 70–99)
GLUCOSE SERPL-MCNC: 287 MG/DL — HIGH (ref 70–99)
HCT VFR BLD CALC: 36.4 % — LOW (ref 39–50)
HCT VFR BLD CALC: 36.9 % — LOW (ref 39–50)
HGB BLD-MCNC: 12 G/DL — LOW (ref 13–17)
HGB BLD-MCNC: 12.3 G/DL — LOW (ref 13–17)
MCHC RBC-ENTMCNC: 28.9 PG — SIGNIFICANT CHANGE UP (ref 27–34)
MCHC RBC-ENTMCNC: 29.1 PG — SIGNIFICANT CHANGE UP (ref 27–34)
MCHC RBC-ENTMCNC: 33 GM/DL — SIGNIFICANT CHANGE UP (ref 32–36)
MCHC RBC-ENTMCNC: 33.3 GM/DL — SIGNIFICANT CHANGE UP (ref 32–36)
MCV RBC AUTO: 87.4 FL — SIGNIFICANT CHANGE UP (ref 80–100)
MCV RBC AUTO: 87.7 FL — SIGNIFICANT CHANGE UP (ref 80–100)
NRBC # BLD: 0 /100 WBCS — SIGNIFICANT CHANGE UP (ref 0–0)
NRBC # BLD: 0 /100 WBCS — SIGNIFICANT CHANGE UP (ref 0–0)
NRBC # FLD: 0 K/UL — SIGNIFICANT CHANGE UP (ref 0–0)
NRBC # FLD: 0 K/UL — SIGNIFICANT CHANGE UP (ref 0–0)
NT-PROBNP SERPL-SCNC: HIGH PG/ML
PLATELET # BLD AUTO: 209 K/UL — SIGNIFICANT CHANGE UP (ref 150–400)
PLATELET # BLD AUTO: 230 K/UL — SIGNIFICANT CHANGE UP (ref 150–400)
POTASSIUM SERPL-MCNC: 4.1 MMOL/L — SIGNIFICANT CHANGE UP (ref 3.5–5.3)
POTASSIUM SERPL-SCNC: 4.1 MMOL/L — SIGNIFICANT CHANGE UP (ref 3.5–5.3)
RBC # BLD: 4.15 M/UL — LOW (ref 4.2–5.8)
RBC # BLD: 4.22 M/UL — SIGNIFICANT CHANGE UP (ref 4.2–5.8)
RBC # FLD: 13.6 % — SIGNIFICANT CHANGE UP (ref 10.3–14.5)
RBC # FLD: 13.9 % — SIGNIFICANT CHANGE UP (ref 10.3–14.5)
SODIUM SERPL-SCNC: 141 MMOL/L — SIGNIFICANT CHANGE UP (ref 135–145)
TROPONIN T, HIGH SENSITIVITY RESULT: 197 NG/L — CRITICAL HIGH
WBC # BLD: 9.35 K/UL — SIGNIFICANT CHANGE UP (ref 3.8–10.5)
WBC # BLD: 9.41 K/UL — SIGNIFICANT CHANGE UP (ref 3.8–10.5)
WBC # FLD AUTO: 9.35 K/UL — SIGNIFICANT CHANGE UP (ref 3.8–10.5)
WBC # FLD AUTO: 9.41 K/UL — SIGNIFICANT CHANGE UP (ref 3.8–10.5)

## 2024-02-24 PROCEDURE — 99232 SBSQ HOSP IP/OBS MODERATE 35: CPT | Mod: GC

## 2024-02-24 PROCEDURE — 71250 CT THORAX DX C-: CPT | Mod: 26

## 2024-02-24 PROCEDURE — 99232 SBSQ HOSP IP/OBS MODERATE 35: CPT

## 2024-02-24 PROCEDURE — 74176 CT ABD & PELVIS W/O CONTRAST: CPT | Mod: 26

## 2024-02-24 RX ORDER — APIXABAN 2.5 MG/1
5 TABLET, FILM COATED ORAL EVERY 12 HOURS
Refills: 0 | Status: DISCONTINUED | OUTPATIENT
Start: 2024-02-24 | End: 2024-02-26

## 2024-02-24 RX ORDER — INSULIN LISPRO 100/ML
8 VIAL (ML) SUBCUTANEOUS
Refills: 0 | Status: DISCONTINUED | OUTPATIENT
Start: 2024-02-24 | End: 2024-02-26

## 2024-02-24 RX ORDER — INSULIN LISPRO 100/ML
8 VIAL (ML) SUBCUTANEOUS ONCE
Refills: 0 | Status: COMPLETED | OUTPATIENT
Start: 2024-02-24 | End: 2024-02-24

## 2024-02-24 RX ORDER — INSULIN LISPRO 100/ML
VIAL (ML) SUBCUTANEOUS
Refills: 0 | Status: DISCONTINUED | OUTPATIENT
Start: 2024-02-24 | End: 2024-02-26

## 2024-02-24 RX ORDER — INSULIN LISPRO 100/ML
VIAL (ML) SUBCUTANEOUS AT BEDTIME
Refills: 0 | Status: DISCONTINUED | OUTPATIENT
Start: 2024-02-24 | End: 2024-02-26

## 2024-02-24 RX ORDER — RIVAROXABAN 15 MG-20MG
1 KIT ORAL
Qty: 30 | Refills: 0
Start: 2024-02-24 | End: 2024-03-24

## 2024-02-24 RX ORDER — APIXABAN 2.5 MG/1
1 TABLET, FILM COATED ORAL
Qty: 60 | Refills: 0
Start: 2024-02-24 | End: 2024-03-24

## 2024-02-24 RX ORDER — HEPARIN SODIUM 5000 [USP'U]/ML
INJECTION INTRAVENOUS; SUBCUTANEOUS
Qty: 25000 | Refills: 0 | Status: DISCONTINUED | OUTPATIENT
Start: 2024-02-24 | End: 2024-02-24

## 2024-02-24 RX ADMIN — ATORVASTATIN CALCIUM 80 MILLIGRAM(S): 80 TABLET, FILM COATED ORAL at 21:51

## 2024-02-24 RX ADMIN — PANTOPRAZOLE SODIUM 40 MILLIGRAM(S): 20 TABLET, DELAYED RELEASE ORAL at 06:51

## 2024-02-24 RX ADMIN — INSULIN GLARGINE 48 UNIT(S): 100 INJECTION, SOLUTION SUBCUTANEOUS at 21:52

## 2024-02-24 RX ADMIN — CARVEDILOL PHOSPHATE 25 MILLIGRAM(S): 80 CAPSULE, EXTENDED RELEASE ORAL at 01:00

## 2024-02-24 RX ADMIN — HEPARIN SODIUM 1800 UNIT(S)/HR: 5000 INJECTION INTRAVENOUS; SUBCUTANEOUS at 11:16

## 2024-02-24 RX ADMIN — HEPARIN SODIUM 1800 UNIT(S)/HR: 5000 INJECTION INTRAVENOUS; SUBCUTANEOUS at 03:37

## 2024-02-24 RX ADMIN — ATORVASTATIN CALCIUM 80 MILLIGRAM(S): 80 TABLET, FILM COATED ORAL at 01:00

## 2024-02-24 RX ADMIN — Medication 3: at 09:11

## 2024-02-24 RX ADMIN — CARVEDILOL PHOSPHATE 25 MILLIGRAM(S): 80 CAPSULE, EXTENDED RELEASE ORAL at 18:07

## 2024-02-24 RX ADMIN — Medication 2: at 01:14

## 2024-02-24 RX ADMIN — CLOPIDOGREL BISULFATE 75 MILLIGRAM(S): 75 TABLET, FILM COATED ORAL at 12:34

## 2024-02-24 RX ADMIN — AMLODIPINE BESYLATE 10 MILLIGRAM(S): 2.5 TABLET ORAL at 06:51

## 2024-02-24 RX ADMIN — HEPARIN SODIUM 1800 UNIT(S)/HR: 5000 INJECTION INTRAVENOUS; SUBCUTANEOUS at 07:26

## 2024-02-24 RX ADMIN — Medication 4: at 12:34

## 2024-02-24 RX ADMIN — Medication 2: at 18:55

## 2024-02-24 RX ADMIN — Medication 8 UNIT(S): at 13:07

## 2024-02-24 RX ADMIN — Medication 0.5 MILLIGRAM(S): at 15:00

## 2024-02-24 RX ADMIN — CARVEDILOL PHOSPHATE 25 MILLIGRAM(S): 80 CAPSULE, EXTENDED RELEASE ORAL at 07:06

## 2024-02-24 RX ADMIN — APIXABAN 5 MILLIGRAM(S): 2.5 TABLET, FILM COATED ORAL at 18:07

## 2024-02-24 RX ADMIN — INSULIN GLARGINE 48 UNIT(S): 100 INJECTION, SOLUTION SUBCUTANEOUS at 01:01

## 2024-02-24 RX ADMIN — Medication 81 MILLIGRAM(S): at 12:34

## 2024-02-24 NOTE — PROGRESS NOTE ADULT - NS ATTEST RISK PROBLEM GEN_ALL_CORE FT
68 year old man with history of CAD s/p PCI with stents to prox and mid LCx (2003) and eventual CABG x 3 vessels 2004, with subsequent PCI with ARMAND to RCA (2005) and known mLCx 100% IRS with collaterals from D1 from [facility] from 2016. There is also h/o HFrEF (EF 40 - 45%), HTN, HLD and DM2. He presented to the ED with chest pain and elevation of HSTropT and serum proBNP. ECG showed new atrial fibrillation with known LBBB. Elevation of HSTropT in setting of afib and complex CAD may in part be demand ischemia. [id number] = 5 points.

## 2024-02-24 NOTE — DISCHARGE NOTE PROVIDER - CARE PROVIDER_API CALL
Selwyn Rodríguez.  Family Medicine  [facility] - Dept of Family Medicine, [address] [place] [address]  Phone: [phone]  Fax: [phone]  Established Patient  Follow Up Time: 1-3 days

## 2024-02-24 NOTE — PROGRESS NOTE ADULT - SUBJECTIVE AND OBJECTIVE BOX
Byron Torrez MD  [facility] Division of Hospital Medicine  Pager: [phone]  Available via MS Teams    SUBJECTIVE / OVERNIGHT EVENTS:    Continues to have right sided chest pain (feels like a nerve)   Also with some abdominal spasms     MEDICATIONS  (STANDING):  amLODIPine   Tablet 10 milliGRAM(s) Oral daily  apixaban 5 milliGRAM(s) Oral every 12 hours  atorvastatin 80 milliGRAM(s) Oral at bedtime  carvedilol 25 milliGRAM(s) Oral every 12 hours  clopidogrel Tablet 75 milliGRAM(s) Oral daily  dextrose 5%. 1000 milliLiter(s) (50 mL/Hr) IV Continuous <Continuous>  dextrose 5%. 1000 milliLiter(s) (100 mL/Hr) IV Continuous <Continuous>  dextrose 50% Injectable 25 Gram(s) IV Push once  dextrose 50% Injectable 25 Gram(s) IV Push once  dextrose 50% Injectable 12.5 Gram(s) IV Push once  glucagon  Injectable 1 milliGRAM(s) IntraMuscular once  influenza  Vaccine (HIGH DOSE) 0.7 milliLiter(s) IntraMuscular once  insulin glargine Injectable (LANTUS) 48 Unit(s) SubCutaneous at bedtime  insulin lispro (ADMELOG) corrective regimen sliding scale   SubCutaneous at bedtime  insulin lispro (ADMELOG) corrective regimen sliding scale   SubCutaneous three times a day before meals  pantoprazole    Tablet 40 milliGRAM(s) Oral before breakfast    MEDICATIONS  (PRN):  acetaminophen     Tablet .. 650 milliGRAM(s) Oral every 6 hours PRN Temp greater or equal to 38C (100.4F), Mild Pain (1 - 3)  ALPRAZolam 0.5 milliGRAM(s) Oral three times a day PRN Anxiety  aluminum hydroxide/magnesium hydroxide/simethicone Suspension 30 milliLiter(s) Oral every 4 hours PRN Dyspepsia  dextrose Oral Gel 15 Gram(s) Oral once PRN Blood Glucose LESS THAN 70 milliGRAM(s)/deciliter  melatonin 3 milliGRAM(s) Oral at bedtime PRN Insomnia  ondansetron Injectable 4 milliGRAM(s) IV Push every 8 hours PRN Nausea and/or Vomiting      I&O's Summary    23 Feb 2024 07:01  -  24 Feb 2024 07:00  --------------------------------------------------------  IN: 0 mL / OUT: 800 mL / NET: -800 mL        PHYSICAL EXAM:  Vital Signs Last 24 Hrs  T(C): 36.5 (24 Feb 2024 14:15), Max: 36.8 (23 Feb 2024 21:15)  T(F): 97.7 (24 Feb 2024 14:15), Max: 98.2 (23 Feb 2024 21:15)  HR: 84 (24 Feb 2024 14:15) (74 - 84)  BP: 140/68 (24 Feb 2024 14:15) (125/49 - 140/68)  BP(mean): 68 (23 Feb 2024 21:15) (68 - 68)  RR: 15 (24 Feb 2024 14:15) (15 - 22)  SpO2: 93% (24 Feb 2024 15:11) (92% - 95%)    Parameters below as of 24 Feb 2024 15:11  Patient On (Oxygen Delivery Method): nasal cannula  O2 Flow (L/min): 1    CONSTITUTIONAL: NAD   EYES: conjunctiva and sclera clear  ENMT: Moist oral mucosa   NECK: Supple   RESPIRATORY: Normal respiratory effort; lungs are clear to auscultation bilaterally  CARDIOVASCULAR: Regular rate and rhythm, normal S1 and S2, no murmur/rub/gallop; Peripheral pulses are 2+ bilaterally  ABDOMEN: Nontender to palpation, normoactive bowel sounds, no rebound/guarding   MUSCULOSKELETAL: No lower extremity edema   PSYCH: A+O to person, place, and time; affect appropriate  NEUROLOGY: moves all extremities   SKIN: No rashes    LABS:                        12.3   9.41  )-----------( 230      ( 24 Feb 2024 10:33 )             36.9     02-24    141  |  104  |  35<H>  ----------------------------<  287<H>  4.1   |  21<L>  |  1.58<H>    Ca    9.3      24 Feb 2024 10:00  Mg     1.60     02-23    TPro  6.1  /  Alb  3.6  /  TBili  0.5  /  DBili  x   /  AST  12  /  ALT  12  /  AlkPhos  87  02-23    PTT - ( 24 Feb 2024 10:00 )  PTT:95.7 sec  CARDIAC MARKERS ( 23 Feb 2024 11:32 )  x     / x     / 124 U/L / x     / 3.5 ng/mL      Urinalysis Basic - ( 24 Feb 2024 10:00 )    Color: x / Appearance: x / SG: x / pH: x  Gluc: 287 mg/dL / Ketone: x  / Bili: x / Urobili: x   Blood: x / Protein: x / Nitrite: x   Leuk Esterase: x / RBC: x / WBC x   Sq Epi: x / Non Sq Epi: x / Bacteria: x            RADIOLOGY & ADDITIONAL TESTS:  Other Results Reviewed Today: BMP with stable Cr, CBC with stable Hg     COORDINATION OF CARE:  Communication: discussed plan of care with ACP

## 2024-02-24 NOTE — PROGRESS NOTE ADULT - PROBLEM SELECTOR PLAN 2
- 3/23 TTE with normal MV   - EKG with afib  - [id number] score 5  - rate control with Coreg  - change heparin drip to eliquis

## 2024-02-24 NOTE — DISCHARGE NOTE PROVIDER - NSDCMRMEDTOKEN_GEN_ALL_CORE_FT
amLODIPine 10 mg oral tablet: 1 tab(s) orally once a day  apixaban 5 mg oral tablet: 1 tab(s) orally every 12 hours INSURANCE CHECK PLEASE for copay/coinsurance  carvedilol 25 mg oral tablet: 1 tab(s) orally 2 times a day  Entresto 97 mg-103 mg oral tablet: 1 tab(s) orally 2 times a day  Lantus 100 units/mL subcutaneous solution: 48 unit(s) subcutaneous once a day (at bedtime)  NovoLOG 100 units/mL subcutaneous solution: 12 unit(s) subcutaneous 3 times a day (with meals)  Plavix 75 mg oral tablet: 1 tab(s) orally once a day  simvastatin 40 mg oral tablet: 1 tab(s) orally once a day (at bedtime)   amLODIPine 10 mg oral tablet: 1 tab(s) orally once a day  apixaban 5 mg oral tablet: 1 tab(s) orally every 12 hours INSURANCE CHECK PLEASE for copay/coinsurance  carvedilol 25 mg oral tablet: 1 tab(s) orally 2 times a day  Entresto 97 mg-103 mg oral tablet: 1 tab(s) orally 2 times a day  Lantus 100 units/mL subcutaneous solution: 48 unit(s) subcutaneous once a day (at bedtime)  NovoLOG 100 units/mL subcutaneous solution: 12 unit(s) subcutaneous 3 times a day (with meals)  Plavix 75 mg oral tablet: 1 tab(s) orally once a day  simvastatin 40 mg oral tablet: 1 tab(s) orally once a day (at bedtime)  Xarelto 20 mg oral tablet: 1 tab(s) orally once a day with evening meal  ***INSURANCE CHECK ONLY****   apixaban 5 mg oral tablet: 1 tab(s) orally every 12 hours  azithromycin 250 mg oral tablet: 1 tab(s) orally once a day  carvedilol 25 mg oral tablet: 1 tab(s) orally 2 times a day  Entresto 97 mg-103 mg oral tablet: 1 tab(s) orally 2 times a day  glucose 40% oral gel: 15 gram(s) orally prn  Lantus 100 units/mL subcutaneous solution: 48 unit(s) subcutaneous once a day (at bedtime)  melatonin 3 mg oral tablet: 1 tab(s) orally once a day (at bedtime) As needed Insomnia  NovoLOG 100 units/mL subcutaneous solution: 8 unit(s) subcutaneous 3 times a day (with meals)  pantoprazole 40 mg oral delayed release tablet: 1 tab(s) orally once a day (before a meal)  Plavix 75 mg oral tablet: 1 tab(s) orally once a day  simvastatin 40 mg oral tablet: 1 tab(s) orally once a day (at bedtime)

## 2024-02-24 NOTE — PROGRESS NOTE ADULT - TIME BILLING
68 year old man with history of CAD s/p PCI with stents to prox and mid LCx (2003) and eventual CABG x 3 vessels 2004, with subsequent PCI with ARMAND to RCA (2005) and known mLCx 100% IRS with collaterals from D1 from [facility] from 2016. There is also h/o HFrEF (EF 40 - 45%), HTN, HLD and DM2. He presented to the ED with chest pain and elevation of HSTropT and serum proBNP. ECG showed new atrial fibrillation with known LBBB. Elevation of HSTropT in setting of afib and complex CAD may in part be demand ischemia. [id number] = 5 points.

## 2024-02-24 NOTE — DISCHARGE NOTE PROVIDER - NSDCCPCAREPLAN_GEN_ALL_CORE_FT
PRINCIPAL DISCHARGE DIAGNOSIS  Diagnosis: Chest pain  Assessment and Plan of Treatment: Continue Eliquis and Plavix, do not stop unless instructed by your physician.  Continue low salt, fat, cholesterol and carbohydrate diet. Follow up with cardiologist and primary care physician's routine appointment.  PLEASE FOLLOW UP WITH YOUR PRIMARY CARE PHYSICIAN TO FACILITATE A CT SCAN OF THE CHEST, ABDOMEN AND PELVIS.   PLEASE FOLLOW UP WITH YOUR CARDIOLOGIST TO FACILITATE A FOLLOW UP TRANSTHORACIC ECHOCARDIOGRAM,         SECONDARY DISCHARGE DIAGNOSES  Diagnosis: Chronic systolic congestive heart failure  Assessment and Plan of Treatment: Low salt diet, fluid restriction to 1500 ml daily, monitor your fluid intake and weight daily, exercise as tolerated 30 minutes daily, and follow up with your physician within 1 to 2 weeks.      Diagnosis: Diabetes  Assessment and Plan of Treatment: Continue your medication regimen and a consistent carbohydrate diet (Meaning eating the same amount of carbohydrates at the same time each day). Monitor blood glucose levels throughout the day before meals and at bedtime. Record blood sugars and bring to outpatient providers appointment in order to be reviewed by your doctor for management modifications. If your sugars are more than 400 or less than 70 you should contact your PCP immediately. Monitor for signs/symptoms of low blood glucose, such as, dizziness, altered mental status, or cool/clammy skin. In addition, monitor for signs/symptoms of high blood glucose, such as, feeling hot, dry, fatigued, or with increased thirst/urination. Make regular podiatry appointments in order to have feet checked for wounds and uncontrolled toe nail growth to prevent infections, as well as, appointments with an ophthalmologist to monitor your vision.      Diagnosis: Hypertension  Assessment and Plan of Treatment: Continue blood pressure medication regimen as directed. Follow a low salt/cholesterol diet. Monitor for any visual changes, headaches or dizziness.  Monitor blood pressure regularly.  Follow up with your primary care provider for further management for high blood pressure.      Diagnosis: Afib  Assessment and Plan of Treatment: Continue medications as prescribed. Follow up with your primary care doctor for further evaluation and management. Please call to make an appointment within 1-2 weeks of discharge.       PRINCIPAL DISCHARGE DIAGNOSIS  Diagnosis: Chest pain  Assessment and Plan of Treatment: Continue Eliquis   -  Continue low salt, fat, cholesterol and carbohydrate diet.   -Follow up with cardiologist and primary care physician's routine appointment.  CT showed:  ground glass opacity in right upper, right middle and right lower lobes, you will need to repeat CT scan   PLEASE FOLLOW UP WITH YOUR PRIMARY CARE PHYSICIAN TO FACILITATE A CT SCAN OF THE CHEST, ABDOMEN AND PELVIS.   PLEASE FOLLOW UP WITH YOUR CARDIOLOGIST TO FACILITATE A FOLLOW UP TRANSTHORACIC ECHOCARDIOGRAM,         SECONDARY DISCHARGE DIAGNOSES  Diagnosis: Ground glass opacity present on imaging of lung  Assessment and Plan of Treatment: Complete 3 more days of Azithromycin   follo clair w/ PCP for further follow up    Diagnosis: Afib  Assessment and Plan of Treatment: Continue medications as prescribed: Eliquis 5 mg 2 x day, monitor for signs of bleeding   - Follow up with your primary care doctor for further evaluation and management. Please call to make an appointment within 1-2 weeks of discharge.      Diagnosis: Diabetes  Assessment and Plan of Treatment: Continue your medication regimen and a consistent carbohydrate diet (Meaning eating the same amount of carbohydrates at the same time each day). Monitor blood glucose levels throughout the day before meals and at bedtime. Record blood sugars and bring to outpatient providers appointment in order to be reviewed by your doctor for management modifications. If your sugars are more than 400 or less than 70 you should contact your PCP immediately. Monitor for signs/symptoms of low blood glucose, such as, dizziness, altered mental status, or cool/clammy skin. In addition, monitor for signs/symptoms of high blood glucose, such as, feeling hot, dry, fatigued, or with increased thirst/urination. Make regular podiatry appointments in order to have feet checked for wounds and uncontrolled toe nail growth to prevent infections, as well as, appointments with an ophthalmologist to monitor your vision.      Diagnosis: Hypertension  Assessment and Plan of Treatment: Continue blood pressure medication regimen as directed. Follow a low salt/cholesterol diet. Monitor for any visual changes, headaches or dizziness.  Monitor blood pressure regularly.  Follow up with your primary care provider for further management for high blood pressure.      Diagnosis: Chronic systolic congestive heart failure  Assessment and Plan of Treatment: Low salt diet, fluid restriction to 1500 ml daily, monitor your fluid intake and weight daily, exercise as tolerated 30 minutes daily, and follow up with your physician within 1 to 2 weeks.  STOP Amlodpine, continue taking Entresto as ordered

## 2024-02-24 NOTE — DISCHARGE NOTE PROVIDER - HOSPITAL COURSE
69 yo M w/ hx CAD s/p stent CABG, HTN, DM on insulin p/w chest pain. + exertional angina and new onset aflutter      Problem/Plan - 1:  ·  Problem: Chest pain.   ·  Plan: - EKG with RBBB   - trop x  2; 171--161  - d-d-aedline neg for age   - Concern for ACS   - already on Plavix and ASA and BB  - start high dose statin   - heparin drip   - check lipid panel   - cath today.     Problem/Plan - 2:  ·  Problem: Atrial flutter.   ·  Plan: - 3/23 TTE with normal MV   - EKG with aflutter  - [id number] score 4   - rate control with Coreg  - heparin drip as above.     Problem/Plan - 3:  ·  Problem: Elevated serum creatinine.   ·  Plan: -  last Cr 2016 1.19  - currently 1.4   - hold Entresto in setting borderline BP and plan for angiogram today   - monitor I/O's.     Problem/Plan - 4:  ·  Problem: Chronic systolic congestive heart failure.   ·  Plan: - Prior TTE- UPOY70-27%; LBBB  - CXR with interstitial markings without overt SOB/JVD; notes cough x 2 weeks; check RVP   - c/w ASA/Coreg/ hold entresto as above.     Problem/Plan - 5:  ·  Problem: Diabetes.   ·  Plan: - monitor FS QAC/HS  - c/w lantus 48 U sq qHS and ISS for now as NPO for cath q6   - monitor FS   - check A1c  - mealtime insulin resumed postcath - patient is tolerating a diet      Problem/Plan - 6:  ·  Problem: Hypertension.   ·  Plan: - c/w Norvasc with hold parameters.     Problem/Plan - 7:  ·  Problem: Prophylactic measure.   ·  Plan: - heparin drip.    Dispo: home to follow up outpatient for CT scan of chest, abdomen and pelvis and to follow up with cardiologist for outpatient follow up TTE    On_________, case was discussed with __________, patient is medically cleared and optimized for discharge today. All medications were reviewed with attending, and sent to mutually agreed upon pharmacy.   67 yo M w/ hx CAD s/p stent CABG, HTN, DM on insulin, CKD 3  p/w chest pain. + exertional angina and new onset aflutter. Labs notable for mildly elevated trop 190. BNP 12K. CT chest showed  patchy ground glass airspace opacities right upper, right middle and right lower lobes. cardiology consulted- pt was started on heparin gtt. LHC showed patent stents. Elevated trop was attributed to rapid afib/flutter, low suspicion for ACS or heart failure. Pt is started on Eliquis for AC. ASA dc'ed. home meds coreg, entresto continued. Echo was done, pt to follow result outpt with cardiology. Pt also received a short course of azithromycin for possible PNA based on CT findings.   Pt is discharged home in stable condition

## 2024-02-24 NOTE — PROGRESS NOTE ADULT - SUBJECTIVE AND OBJECTIVE BOX
Patient seen and examined at bedside.    Overnight Events:     Denies any palpitations chest pain, SOB     Tele shows sinus rhythm     Current Meds:  acetaminophen     Tablet .. 650 milliGRAM(s) Oral every 6 hours PRN  ALPRAZolam 0.5 milliGRAM(s) Oral three times a day PRN  aluminum hydroxide/magnesium hydroxide/simethicone Suspension 30 milliLiter(s) Oral every 4 hours PRN  amLODIPine   Tablet 10 milliGRAM(s) Oral daily  aspirin enteric coated 81 milliGRAM(s) Oral daily  atorvastatin 80 milliGRAM(s) Oral at bedtime  carvedilol 25 milliGRAM(s) Oral every 12 hours  clopidogrel Tablet 75 milliGRAM(s) Oral daily  dextrose 5%. 1000 milliLiter(s) IV Continuous <Continuous>  dextrose 5%. 1000 milliLiter(s) IV Continuous <Continuous>  dextrose 50% Injectable 25 Gram(s) IV Push once  dextrose 50% Injectable 25 Gram(s) IV Push once  dextrose 50% Injectable 12.5 Gram(s) IV Push once  dextrose Oral Gel 15 Gram(s) Oral once PRN  glucagon  Injectable 1 milliGRAM(s) IntraMuscular once  heparin  Infusion.  Unit(s)/Hr IV Continuous <Continuous>  influenza  Vaccine (HIGH DOSE) 0.7 milliLiter(s) IntraMuscular once  insulin glargine Injectable (LANTUS) 48 Unit(s) SubCutaneous at bedtime  insulin lispro (ADMELOG) corrective regimen sliding scale   SubCutaneous three times a day before meals  insulin lispro (ADMELOG) corrective regimen sliding scale   SubCutaneous at bedtime  melatonin 3 milliGRAM(s) Oral at bedtime PRN  ondansetron Injectable 4 milliGRAM(s) IV Push every 8 hours PRN  pantoprazole    Tablet 40 milliGRAM(s) Oral before breakfast      Vitals:  T(F): 98.2 (02-23), Max: 98.4 (02-23)  HR: 74 (02-23) (64 - 74)  BP: 125/49 (02-23) (107/85 - 134/70)  RR: 22 (02-23)  SpO2: 95% (02-23)  I&O's Summary    23 Feb 2024 07:01  -  24 Feb 2024 07:00  --------------------------------------------------------  IN: 0 mL / OUT: 800 mL / NET: -800 mL        Physical Exam:  Appearance: No acute distress   Cardiovascular: RRR, S1, S2, no murmurs, rubs, or gallops; no edema; no JVD  Respiratory: Clear to auscultation bilaterally                          12.4   9.35  )-----------( 239      ( 23 Feb 2024 10:10 )             36.8     02-23    141  |  106  |  26<H>  ----------------------------<  222<H>  4.4   |  24  |  1.40<H>    Ca    9.6      23 Feb 2024 10:10  Mg     1.60     02-23    TPro  6.1  /  Alb  3.6  /  TBili  0.5  /  DBili  x   /  AST  12  /  ALT  12  /  AlkPhos  87  02-23    PTT - ( 23 Feb 2024 10:10 )  PTT:35.5 sec  CARDIAC MARKERS ( 23 Feb 2024 11:32 )  161 ng/L / x     / x     / 124 U/L / x     / 3.5 ng/mL  CARDIAC MARKERS ( 23 Feb 2024 10:10 )  171 ng/L / x     / x     / x     / x     / x                  New ECG(s):     Echo:    Stress Testing:     Cath:    Imaging:    Interpretation of Telemetry:

## 2024-02-25 DIAGNOSIS — R91.8 OTHER NONSPECIFIC ABNORMAL FINDING OF LUNG FIELD: ICD-10-CM

## 2024-02-25 LAB
A1C WITH ESTIMATED AVERAGE GLUCOSE RESULT: 6.1 % — HIGH (ref 4–5.6)
ANION GAP SERPL CALC-SCNC: 12 MMOL/L — SIGNIFICANT CHANGE UP (ref 7–14)
B PERT DNA SPEC QL NAA+PROBE: SIGNIFICANT CHANGE UP
B PERT+PARAPERT DNA PNL SPEC NAA+PROBE: SIGNIFICANT CHANGE UP
BORDETELLA PARAPERTUSSIS (RAPRVP): SIGNIFICANT CHANGE UP
BUN SERPL-MCNC: 42 MG/DL — HIGH (ref 7–23)
C PNEUM DNA SPEC QL NAA+PROBE: SIGNIFICANT CHANGE UP
CALCIUM SERPL-MCNC: 8.7 MG/DL — SIGNIFICANT CHANGE UP (ref 8.4–10.5)
CHLORIDE SERPL-SCNC: 103 MMOL/L — SIGNIFICANT CHANGE UP (ref 98–107)
CHOLEST SERPL-MCNC: 116 MG/DL — SIGNIFICANT CHANGE UP
CO2 SERPL-SCNC: 24 MMOL/L — SIGNIFICANT CHANGE UP (ref 22–31)
CREAT SERPL-MCNC: 1.65 MG/DL — HIGH (ref 0.5–1.3)
EGFR: 45 ML/MIN/1.73M2 — LOW
ESTIMATED AVERAGE GLUCOSE: 128 — SIGNIFICANT CHANGE UP
FLUAV SUBTYP SPEC NAA+PROBE: SIGNIFICANT CHANGE UP
FLUBV RNA SPEC QL NAA+PROBE: SIGNIFICANT CHANGE UP
GLUCOSE BLDC GLUCOMTR-MCNC: 114 MG/DL — HIGH (ref 70–99)
GLUCOSE BLDC GLUCOMTR-MCNC: 129 MG/DL — HIGH (ref 70–99)
GLUCOSE BLDC GLUCOMTR-MCNC: 137 MG/DL — HIGH (ref 70–99)
GLUCOSE BLDC GLUCOMTR-MCNC: 196 MG/DL — HIGH (ref 70–99)
GLUCOSE SERPL-MCNC: 110 MG/DL — HIGH (ref 70–99)
HADV DNA SPEC QL NAA+PROBE: SIGNIFICANT CHANGE UP
HCOV 229E RNA SPEC QL NAA+PROBE: SIGNIFICANT CHANGE UP
HCOV HKU1 RNA SPEC QL NAA+PROBE: SIGNIFICANT CHANGE UP
HCOV NL63 RNA SPEC QL NAA+PROBE: SIGNIFICANT CHANGE UP
HCOV OC43 RNA SPEC QL NAA+PROBE: SIGNIFICANT CHANGE UP
HCT VFR BLD CALC: 35.8 % — LOW (ref 39–50)
HDLC SERPL-MCNC: 55 MG/DL — SIGNIFICANT CHANGE UP
HGB BLD-MCNC: 11.8 G/DL — LOW (ref 13–17)
HMPV RNA SPEC QL NAA+PROBE: SIGNIFICANT CHANGE UP
HPIV1 RNA SPEC QL NAA+PROBE: SIGNIFICANT CHANGE UP
HPIV2 RNA SPEC QL NAA+PROBE: SIGNIFICANT CHANGE UP
HPIV3 RNA SPEC QL NAA+PROBE: SIGNIFICANT CHANGE UP
HPIV4 RNA SPEC QL NAA+PROBE: SIGNIFICANT CHANGE UP
LIPID PNL WITH DIRECT LDL SERPL: 44 MG/DL — SIGNIFICANT CHANGE UP
M PNEUMO DNA SPEC QL NAA+PROBE: SIGNIFICANT CHANGE UP
MAGNESIUM SERPL-MCNC: 2 MG/DL — SIGNIFICANT CHANGE UP (ref 1.6–2.6)
MCHC RBC-ENTMCNC: 29 PG — SIGNIFICANT CHANGE UP (ref 27–34)
MCHC RBC-ENTMCNC: 33 GM/DL — SIGNIFICANT CHANGE UP (ref 32–36)
MCV RBC AUTO: 88 FL — SIGNIFICANT CHANGE UP (ref 80–100)
NON HDL CHOLESTEROL: 61 MG/DL — SIGNIFICANT CHANGE UP
NRBC # BLD: 0 /100 WBCS — SIGNIFICANT CHANGE UP (ref 0–0)
NRBC # FLD: 0 K/UL — SIGNIFICANT CHANGE UP (ref 0–0)
PHOSPHATE SERPL-MCNC: 3.1 MG/DL — SIGNIFICANT CHANGE UP (ref 2.5–4.5)
PLATELET # BLD AUTO: 223 K/UL — SIGNIFICANT CHANGE UP (ref 150–400)
POTASSIUM SERPL-MCNC: 3.6 MMOL/L — SIGNIFICANT CHANGE UP (ref 3.5–5.3)
POTASSIUM SERPL-SCNC: 3.6 MMOL/L — SIGNIFICANT CHANGE UP (ref 3.5–5.3)
RAPID RVP RESULT: SIGNIFICANT CHANGE UP
RBC # BLD: 4.07 M/UL — LOW (ref 4.2–5.8)
RBC # FLD: 13.6 % — SIGNIFICANT CHANGE UP (ref 10.3–14.5)
RSV RNA SPEC QL NAA+PROBE: SIGNIFICANT CHANGE UP
RV+EV RNA SPEC QL NAA+PROBE: SIGNIFICANT CHANGE UP
SARS-COV-2 RNA SPEC QL NAA+PROBE: SIGNIFICANT CHANGE UP
SODIUM SERPL-SCNC: 139 MMOL/L — SIGNIFICANT CHANGE UP (ref 135–145)
TRIGL SERPL-MCNC: 84 MG/DL — SIGNIFICANT CHANGE UP
WBC # BLD: 9.98 K/UL — SIGNIFICANT CHANGE UP (ref 3.8–10.5)
WBC # FLD AUTO: 9.98 K/UL — SIGNIFICANT CHANGE UP (ref 3.8–10.5)

## 2024-02-25 PROCEDURE — 99232 SBSQ HOSP IP/OBS MODERATE 35: CPT

## 2024-02-25 RX ORDER — AZITHROMYCIN 500 MG/1
500 TABLET, FILM COATED ORAL ONCE
Refills: 0 | Status: COMPLETED | OUTPATIENT
Start: 2024-02-25 | End: 2024-02-25

## 2024-02-25 RX ORDER — AZITHROMYCIN 500 MG/1
250 TABLET, FILM COATED ORAL DAILY
Refills: 0 | Status: DISCONTINUED | OUTPATIENT
Start: 2024-02-26 | End: 2024-02-26

## 2024-02-25 RX ORDER — AZITHROMYCIN 500 MG/1
TABLET, FILM COATED ORAL
Refills: 0 | Status: DISCONTINUED | OUTPATIENT
Start: 2024-02-25 | End: 2024-02-26

## 2024-02-25 RX ADMIN — ATORVASTATIN CALCIUM 80 MILLIGRAM(S): 80 TABLET, FILM COATED ORAL at 21:47

## 2024-02-25 RX ADMIN — CARVEDILOL PHOSPHATE 25 MILLIGRAM(S): 80 CAPSULE, EXTENDED RELEASE ORAL at 05:52

## 2024-02-25 RX ADMIN — Medication 8 UNIT(S): at 08:43

## 2024-02-25 RX ADMIN — AZITHROMYCIN 500 MILLIGRAM(S): 500 TABLET, FILM COATED ORAL at 18:46

## 2024-02-25 RX ADMIN — AMLODIPINE BESYLATE 10 MILLIGRAM(S): 2.5 TABLET ORAL at 05:52

## 2024-02-25 RX ADMIN — PANTOPRAZOLE SODIUM 40 MILLIGRAM(S): 20 TABLET, DELAYED RELEASE ORAL at 08:42

## 2024-02-25 RX ADMIN — CLOPIDOGREL BISULFATE 75 MILLIGRAM(S): 75 TABLET, FILM COATED ORAL at 12:31

## 2024-02-25 RX ADMIN — Medication 8 UNIT(S): at 17:59

## 2024-02-25 RX ADMIN — INSULIN GLARGINE 48 UNIT(S): 100 INJECTION, SOLUTION SUBCUTANEOUS at 21:49

## 2024-02-25 RX ADMIN — CARVEDILOL PHOSPHATE 25 MILLIGRAM(S): 80 CAPSULE, EXTENDED RELEASE ORAL at 17:59

## 2024-02-25 RX ADMIN — APIXABAN 5 MILLIGRAM(S): 2.5 TABLET, FILM COATED ORAL at 17:58

## 2024-02-25 RX ADMIN — APIXABAN 5 MILLIGRAM(S): 2.5 TABLET, FILM COATED ORAL at 05:53

## 2024-02-25 RX ADMIN — Medication 8 UNIT(S): at 12:31

## 2024-02-25 NOTE — PROGRESS NOTE ADULT - SUBJECTIVE AND OBJECTIVE BOX
Byron Torrez MD  [facility] Division of Hospital Medicine  Pager: [phone]  Available via MS Teams    SUBJECTIVE / OVERNIGHT EVENTS:    Feels well this morning   CT scan done   Awaiting TTE     MEDICATIONS  (STANDING):  amLODIPine   Tablet 10 milliGRAM(s) Oral daily  apixaban 5 milliGRAM(s) Oral every 12 hours  atorvastatin 80 milliGRAM(s) Oral at bedtime  carvedilol 25 milliGRAM(s) Oral every 12 hours  clopidogrel Tablet 75 milliGRAM(s) Oral daily  dextrose 5%. 1000 milliLiter(s) (50 mL/Hr) IV Continuous <Continuous>  dextrose 5%. 1000 milliLiter(s) (100 mL/Hr) IV Continuous <Continuous>  dextrose 50% Injectable 25 Gram(s) IV Push once  dextrose 50% Injectable 25 Gram(s) IV Push once  dextrose 50% Injectable 12.5 Gram(s) IV Push once  glucagon  Injectable 1 milliGRAM(s) IntraMuscular once  influenza  Vaccine (HIGH DOSE) 0.7 milliLiter(s) IntraMuscular once  insulin glargine Injectable (LANTUS) 48 Unit(s) SubCutaneous at bedtime  insulin lispro (ADMELOG) corrective regimen sliding scale   SubCutaneous at bedtime  insulin lispro (ADMELOG) corrective regimen sliding scale   SubCutaneous three times a day before meals  insulin lispro Injectable (ADMELOG) 8 Unit(s) SubCutaneous three times a day before meals  pantoprazole    Tablet 40 milliGRAM(s) Oral before breakfast    MEDICATIONS  (PRN):  acetaminophen     Tablet .. 650 milliGRAM(s) Oral every 6 hours PRN Temp greater or equal to 38C (100.4F), Mild Pain (1 - 3)  ALPRAZolam 0.5 milliGRAM(s) Oral three times a day PRN Anxiety  aluminum hydroxide/magnesium hydroxide/simethicone Suspension 30 milliLiter(s) Oral every 4 hours PRN Dyspepsia  dextrose Oral Gel 15 Gram(s) Oral once PRN Blood Glucose LESS THAN 70 milliGRAM(s)/deciliter  melatonin 3 milliGRAM(s) Oral at bedtime PRN Insomnia  ondansetron Injectable 4 milliGRAM(s) IV Push every 8 hours PRN Nausea and/or Vomiting      I&O's Summary    25 Feb 2024 07:01  -  25 Feb 2024 18:29  --------------------------------------------------------  IN: 200 mL / OUT: 700 mL / NET: -500 mL        PHYSICAL EXAM:  Vital Signs Last 24 Hrs  T(C): 36.8 (25 Feb 2024 14:15), Max: 37 (24 Feb 2024 21:35)  T(F): 98.3 (25 Feb 2024 14:15), Max: 98.6 (24 Feb 2024 21:35)  HR: 67 (25 Feb 2024 14:15) (67 - 80)  BP: 132/53 (25 Feb 2024 14:15) (132/53 - 142/62)  BP(mean): --  RR: 18 (25 Feb 2024 14:15) (16 - 20)  SpO2: 95% (25 Feb 2024 14:15) (92% - 96%)    Parameters below as of 25 Feb 2024 14:15  Patient On (Oxygen Delivery Method): nasal cannula      CONSTITUTIONAL: NAD   EYES: conjunctiva and sclera clear  ENMT: Moist oral mucosa   NECK: Supple   RESPIRATORY: Normal respiratory effort; lungs are clear to auscultation bilaterally  CARDIOVASCULAR: Regular rate and rhythm, normal S1 and S2, no murmur/rub/gallop; Peripheral pulses are 2+ bilaterally  ABDOMEN: Nontender to palpation, normoactive bowel sounds, no rebound/guarding   MUSCULOSKELETAL: No lower extremity edema   PSYCH: A+O to person, place, and time; affect appropriate  NEUROLOGY: moves all extremities   SKIN: No rashes    LABS:                        11.8   9.98  )-----------( 223      ( 25 Feb 2024 06:24 )             35.8     02-25    139  |  103  |  42<H>  ----------------------------<  110<H>  3.6   |  24  |  1.65<H>    Ca    8.7      25 Feb 2024 06:24  Phos  3.1     02-25  Mg     2.00     02-25      PTT - ( 24 Feb 2024 10:00 )  PTT:95.7 sec      Urinalysis Basic - ( 25 Feb 2024 06:24 )    Color: x / Appearance: x / SG: x / pH: x  Gluc: 110 mg/dL / Ketone: x  / Bili: x / Urobili: x   Blood: x / Protein: x / Nitrite: x   Leuk Esterase: x / RBC: x / WBC x   Sq Epi: x / Non Sq Epi: x / Bacteria: x        SARS-CoV-2: NotDetec (25 Feb 2024 12:14)      RADIOLOGY & ADDITIONAL TESTS:  Other Results Reviewed Today: BMP with stable Cr, CBC with stable Hg     COORDINATION OF CARE:  Communication: discussed plan of care with ACP

## 2024-02-25 NOTE — PROGRESS NOTE ADULT - PROBLEM SELECTOR PLAN 2
- CT scans with GGOs in right upper, right middle and right lower lobes.   - RVP negative on 2/25  - although does not have symptoms (no leukocytosis, afebrile, no sig cough with sputum), will start aizthro to cover atypical PNA

## 2024-02-25 NOTE — PROGRESS NOTE ADULT - PROBLEM SELECTOR PLAN 4
- Prior TTE- [id number]%; LBBB  - CXR with interstitial markings without overt SOB/JVD; notes cough x 2 weeks;  RVP negative   - c/w ASA/Coreg/ hold entresto as above
-  last Cr 2016 1.19  - hold Entresto given c/f dianne and recent contrast load due to cath  - trend for now

## 2024-02-25 NOTE — PROGRESS NOTE ADULT - PROBLEM SELECTOR PLAN 5
- Prior TTE- [id number]%; LBBB  - CXR with interstitial markings without overt SOB/JVD; notes cough x 2 weeks;  RVP negative   - c/w ASA/Coreg/ hold entresto as above
- monitor FS QAC/HS  - c/w lantus 48 U sq qHS and restart reduced dose meal time at 8 units TID AC   - monitor FS   - f/u A1c

## 2024-02-25 NOTE — PROGRESS NOTE ADULT - PROBLEM SELECTOR PLAN 3
-  last Cr 2016 1.19  - currently 1.4   - hold Entresto given c/f dianne and recent contrast load due to cath
- 3/23 TTE with normal MV   - EKG with afib  - [id number] score 5  - rate control with Coreg  - eliquis for AC

## 2024-02-25 NOTE — PROGRESS NOTE ADULT - PROBLEM SELECTOR PLAN 6
- monitor FS QAC/HS  - c/w lantus 48 U sq qHS and restart reduced dose meal time at 8 units TID AC   - monitor FS   - A1c 6.1
- c/w Norvasc with hold parameters

## 2024-02-25 NOTE — PROGRESS NOTE ADULT - PROBLEM SELECTOR PLAN 1
- EKG with known LBBB   - trop x  2; 171--161--197  - d-d-adeline neg for age   - Concern for ACS, so underwent cath on 2/23  - S/p cath, patent stents, cardiology recs medical management    - at home on Plavix and ASA. Will have to dc aspirin as we are starting eliquis for a fib  - continue BB and start high dose statin  - given ongoing chest pain and possible new oxygen requirement, plan on obtaining CT chest/CTAP and TTE   - consider V/Q if dyspnea persists
- EKG with known LBBB   - trop x  2; 171--161--197  - d-d-adeline neg for age   - Concern for ACS, so underwent cath on 2/23  - S/p cath, patent stents, cardiology recs medical management    - at home on Plavix and ASA. Aspirin now DCed as we started eliquis for a fib  - continue BB and start high dose statin  - given ongoing chest pain and possible new oxygen requirement, will obtain TTE inpatient   - consider V/Q if oxygen req or dyspnea persists  - will need amb pulse ox tomorrow

## 2024-02-26 ENCOUNTER — TRANSCRIPTION ENCOUNTER (OUTPATIENT)
Age: 69
End: 2024-02-26

## 2024-02-26 ENCOUNTER — RESULT REVIEW (OUTPATIENT)
Age: 69
End: 2024-02-26

## 2024-02-26 VITALS
HEART RATE: 70 BPM | DIASTOLIC BLOOD PRESSURE: 65 MMHG | OXYGEN SATURATION: 99 % | SYSTOLIC BLOOD PRESSURE: 138 MMHG | TEMPERATURE: 98 F | RESPIRATION RATE: 17 BRPM

## 2024-02-26 LAB
ANION GAP SERPL CALC-SCNC: 11 MMOL/L — SIGNIFICANT CHANGE UP (ref 7–14)
BUN SERPL-MCNC: 46 MG/DL — HIGH (ref 7–23)
CALCIUM SERPL-MCNC: 8.5 MG/DL — SIGNIFICANT CHANGE UP (ref 8.4–10.5)
CHLORIDE SERPL-SCNC: 106 MMOL/L — SIGNIFICANT CHANGE UP (ref 98–107)
CO2 SERPL-SCNC: 23 MMOL/L — SIGNIFICANT CHANGE UP (ref 22–31)
CREAT SERPL-MCNC: 1.59 MG/DL — HIGH (ref 0.5–1.3)
EGFR: 47 ML/MIN/1.73M2 — LOW
GLUCOSE BLDC GLUCOMTR-MCNC: 164 MG/DL — HIGH (ref 70–99)
GLUCOSE BLDC GLUCOMTR-MCNC: 173 MG/DL — HIGH (ref 70–99)
GLUCOSE SERPL-MCNC: 130 MG/DL — HIGH (ref 70–99)
HCT VFR BLD CALC: 35.3 % — LOW (ref 39–50)
HGB BLD-MCNC: 11.8 G/DL — LOW (ref 13–17)
MAGNESIUM SERPL-MCNC: 1.9 MG/DL — SIGNIFICANT CHANGE UP (ref 1.6–2.6)
MCHC RBC-ENTMCNC: 28.9 PG — SIGNIFICANT CHANGE UP (ref 27–34)
MCHC RBC-ENTMCNC: 33.4 GM/DL — SIGNIFICANT CHANGE UP (ref 32–36)
MCV RBC AUTO: 86.3 FL — SIGNIFICANT CHANGE UP (ref 80–100)
NRBC # BLD: 0 /100 WBCS — SIGNIFICANT CHANGE UP (ref 0–0)
NRBC # FLD: 0 K/UL — SIGNIFICANT CHANGE UP (ref 0–0)
PHOSPHATE SERPL-MCNC: 3.1 MG/DL — SIGNIFICANT CHANGE UP (ref 2.5–4.5)
PLATELET # BLD AUTO: 241 K/UL — SIGNIFICANT CHANGE UP (ref 150–400)
POTASSIUM SERPL-MCNC: 3.6 MMOL/L — SIGNIFICANT CHANGE UP (ref 3.5–5.3)
POTASSIUM SERPL-SCNC: 3.6 MMOL/L — SIGNIFICANT CHANGE UP (ref 3.5–5.3)
RBC # BLD: 4.09 M/UL — LOW (ref 4.2–5.8)
RBC # FLD: 13.3 % — SIGNIFICANT CHANGE UP (ref 10.3–14.5)
SODIUM SERPL-SCNC: 140 MMOL/L — SIGNIFICANT CHANGE UP (ref 135–145)
WBC # BLD: 7.23 K/UL — SIGNIFICANT CHANGE UP (ref 3.8–10.5)
WBC # FLD AUTO: 7.23 K/UL — SIGNIFICANT CHANGE UP (ref 3.8–10.5)

## 2024-02-26 PROCEDURE — 99239 HOSP IP/OBS DSCHRG MGMT >30: CPT

## 2024-02-26 PROCEDURE — 99232 SBSQ HOSP IP/OBS MODERATE 35: CPT

## 2024-02-26 RX ORDER — PANTOPRAZOLE SODIUM 20 MG/1
1 TABLET, DELAYED RELEASE ORAL
Qty: 30 | Refills: 0
Start: 2024-02-26 | End: 2024-03-26

## 2024-02-26 RX ORDER — AZITHROMYCIN 500 MG/1
1 TABLET, FILM COATED ORAL
Qty: 3 | Refills: 0
Start: 2024-02-26 | End: 2024-02-28

## 2024-02-26 RX ORDER — APIXABAN 2.5 MG/1
1 TABLET, FILM COATED ORAL
Qty: 60 | Refills: 0
Start: 2024-02-26 | End: 2024-03-26

## 2024-02-26 RX ORDER — POTASSIUM CHLORIDE 20 MEQ
20 PACKET (EA) ORAL ONCE
Refills: 0 | Status: COMPLETED | OUTPATIENT
Start: 2024-02-26 | End: 2024-02-26

## 2024-02-26 RX ORDER — DEXTROSE 50 % IN WATER 50 %
15 SYRINGE (ML) INTRAVENOUS
Qty: 0 | Refills: 0 | DISCHARGE
Start: 2024-02-26

## 2024-02-26 RX ORDER — MAGNESIUM OXIDE 400 MG ORAL TABLET 241.3 MG
400 TABLET ORAL
Refills: 0 | Status: DISCONTINUED | OUTPATIENT
Start: 2024-02-26 | End: 2024-02-26

## 2024-02-26 RX ORDER — LANOLIN ALCOHOL/MO/W.PET/CERES
1 CREAM (GRAM) TOPICAL
Qty: 0 | Refills: 0 | DISCHARGE
Start: 2024-02-26

## 2024-02-26 RX ADMIN — CLOPIDOGREL BISULFATE 75 MILLIGRAM(S): 75 TABLET, FILM COATED ORAL at 12:44

## 2024-02-26 RX ADMIN — Medication 1: at 09:08

## 2024-02-26 RX ADMIN — Medication 8 UNIT(S): at 09:08

## 2024-02-26 RX ADMIN — Medication 1: at 12:43

## 2024-02-26 RX ADMIN — AZITHROMYCIN 250 MILLIGRAM(S): 500 TABLET, FILM COATED ORAL at 13:05

## 2024-02-26 RX ADMIN — CARVEDILOL PHOSPHATE 25 MILLIGRAM(S): 80 CAPSULE, EXTENDED RELEASE ORAL at 05:16

## 2024-02-26 RX ADMIN — APIXABAN 5 MILLIGRAM(S): 2.5 TABLET, FILM COATED ORAL at 05:17

## 2024-02-26 RX ADMIN — AMLODIPINE BESYLATE 10 MILLIGRAM(S): 2.5 TABLET ORAL at 05:17

## 2024-02-26 RX ADMIN — Medication 8 UNIT(S): at 12:43

## 2024-02-26 RX ADMIN — PANTOPRAZOLE SODIUM 40 MILLIGRAM(S): 20 TABLET, DELAYED RELEASE ORAL at 05:19

## 2024-02-26 RX ADMIN — Medication 20 MILLIEQUIVALENT(S): at 09:12

## 2024-02-26 RX ADMIN — Medication 3 MILLIGRAM(S): at 01:46

## 2024-02-26 NOTE — DISCHARGE NOTE NURSING/CASE MANAGEMENT/SOCIAL WORK - NSDCPEFALRISK_GEN_ALL_CORE
For information on Fall & Injury Prevention, visit: [email or web address] OR  [email or web address] OR  https://www.cdc.gov/steadi/patient.html

## 2024-02-26 NOTE — PROGRESS NOTE ADULT - SUBJECTIVE AND OBJECTIVE BOX
Subjective    Patient seen and examined at bedside.  No chest pain, shortness of breath, or palpitations            Telemetry review:     Current Meds:  acetaminophen     Tablet .. 650 milliGRAM(s) Oral every 6 hours PRN  ALPRAZolam 0.5 milliGRAM(s) Oral three times a day PRN  aluminum hydroxide/magnesium hydroxide/simethicone Suspension 30 milliLiter(s) Oral every 4 hours PRN  amLODIPine   Tablet 10 milliGRAM(s) Oral daily  apixaban 5 milliGRAM(s) Oral every 12 hours  atorvastatin 80 milliGRAM(s) Oral at bedtime  azithromycin   Tablet 250 milliGRAM(s) Oral daily  azithromycin   Tablet      carvedilol 25 milliGRAM(s) Oral every 12 hours  clopidogrel Tablet 75 milliGRAM(s) Oral daily  dextrose 5%. 1000 milliLiter(s) IV Continuous <Continuous>  dextrose 5%. 1000 milliLiter(s) IV Continuous <Continuous>  dextrose 50% Injectable 25 Gram(s) IV Push once  dextrose 50% Injectable 25 Gram(s) IV Push once  dextrose 50% Injectable 12.5 Gram(s) IV Push once  dextrose Oral Gel 15 Gram(s) Oral once PRN  glucagon  Injectable 1 milliGRAM(s) IntraMuscular once  influenza  Vaccine (HIGH DOSE) 0.7 milliLiter(s) IntraMuscular once  insulin glargine Injectable (LANTUS) 48 Unit(s) SubCutaneous at bedtime  insulin lispro (ADMELOG) corrective regimen sliding scale   SubCutaneous three times a day before meals  insulin lispro (ADMELOG) corrective regimen sliding scale   SubCutaneous at bedtime  insulin lispro Injectable (ADMELOG) 8 Unit(s) SubCutaneous three times a day before meals  magnesium oxide 400 milliGRAM(s) Oral two times a day with meals  melatonin 3 milliGRAM(s) Oral at bedtime PRN  ondansetron Injectable 4 milliGRAM(s) IV Push every 8 hours PRN  pantoprazole    Tablet 40 milliGRAM(s) Oral before breakfast      Vitals:  T(F): 98.3 (02-26), Max: 98.7 (02-25)  HR: 70 (02-26) (67 - 73)  BP: 134/72 (02-26) (132/53 - 139/65)  RR: 18 (02-26)  SpO2: 100% (02-26)  I&O's Summary    25 Feb 2024 07:01  -  26 Feb 2024 07:00  --------------------------------------------------------  IN: 200 mL / OUT: 700 mL / NET: -500 mL        Physical Exam:  General: No acute distress; well appearing  Eyes: PERRL, EOMI, pink conjunctiva  HEENT: Normal oral mucosa  Cardiovascular: RRR, S1, S2, no murmurs, rubs, or gallops; no edema; no JVD  Respiratory: Clear to auscultation bilaterally, speaking full sentences  Gastrointestinal: soft, non-tender, non-distended with normal bowel sounds  Extremities: 2+ pulses, no clubbing; no joint deformity, or edema  Neurologic: AAOx3,  Non-focal  Skin: No rashes, ecchymoses, or cyanosis                          11.8   7.23  )-----------( 241      ( 26 Feb 2024 05:56 )             35.3     02-26    140  |  106  |  46<H>  ----------------------------<  130<H>  3.6   |  23  |  1.59<H>    Ca    8.5      26 Feb 2024 05:56  Phos  3.1     02-26  Mg     1.90     02-26        CARDIAC MARKERS ( 24 Feb 2024 10:00 )  197 ng/L / x     / x     / x     / x     / x      CARDIAC MARKERS ( 23 Feb 2024 11:32 )  161 ng/L / x     / x     / 124 U/L / x     / 3.5 ng/mL  CARDIAC MARKERS ( 23 Feb 2024 10:10 )  171 ng/L / x     / x     / x     / x     / x                   Subjective    Patient seen and examined at bedside.  No chest pain, shortness of breath, or palpitations            Telemetry review: Sinus rhythm    Current Meds:  acetaminophen     Tablet .. 650 milliGRAM(s) Oral every 6 hours PRN  ALPRAZolam 0.5 milliGRAM(s) Oral three times a day PRN  aluminum hydroxide/magnesium hydroxide/simethicone Suspension 30 milliLiter(s) Oral every 4 hours PRN  amLODIPine   Tablet 10 milliGRAM(s) Oral daily  apixaban 5 milliGRAM(s) Oral every 12 hours  atorvastatin 80 milliGRAM(s) Oral at bedtime  azithromycin   Tablet 250 milliGRAM(s) Oral daily  azithromycin   Tablet      carvedilol 25 milliGRAM(s) Oral every 12 hours  clopidogrel Tablet 75 milliGRAM(s) Oral daily  dextrose 5%. 1000 milliLiter(s) IV Continuous <Continuous>  dextrose 5%. 1000 milliLiter(s) IV Continuous <Continuous>  dextrose 50% Injectable 25 Gram(s) IV Push once  dextrose 50% Injectable 25 Gram(s) IV Push once  dextrose 50% Injectable 12.5 Gram(s) IV Push once  dextrose Oral Gel 15 Gram(s) Oral once PRN  glucagon  Injectable 1 milliGRAM(s) IntraMuscular once  influenza  Vaccine (HIGH DOSE) 0.7 milliLiter(s) IntraMuscular once  insulin glargine Injectable (LANTUS) 48 Unit(s) SubCutaneous at bedtime  insulin lispro (ADMELOG) corrective regimen sliding scale   SubCutaneous three times a day before meals  insulin lispro (ADMELOG) corrective regimen sliding scale   SubCutaneous at bedtime  insulin lispro Injectable (ADMELOG) 8 Unit(s) SubCutaneous three times a day before meals  magnesium oxide 400 milliGRAM(s) Oral two times a day with meals  melatonin 3 milliGRAM(s) Oral at bedtime PRN  ondansetron Injectable 4 milliGRAM(s) IV Push every 8 hours PRN  pantoprazole    Tablet 40 milliGRAM(s) Oral before breakfast      Vitals:  T(F): 98.3 (02-26), Max: 98.7 (02-25)  HR: 70 (02-26) (67 - 73)  BP: 134/72 (02-26) (132/53 - 139/65)  RR: 18 (02-26)  SpO2: 100% (02-26)  I&O's Summary    25 Feb 2024 07:01  -  26 Feb 2024 07:00  --------------------------------------------------------  IN: 200 mL / OUT: 700 mL / NET: -500 mL        Physical Exam:  General: No acute distress; well appearing  Cardiovascular: RRR, S1, S2, no murmurs, rubs, or gallops; no edema; no JVD  Respiratory: Clear to auscultation bilaterally, speaking full sentences  Gastrointestinal: soft, non-tender, non-distended with normal bowel sounds  Extremities: 2+ pulses, no clubbing; no joint deformity, or edema  Neurologic: AAOx3,  Non-focal  Skin: No rashes, ecchymoses, or cyanosis                          11.8   7.23  )-----------( 241      ( 26 Feb 2024 05:56 )             35.3     02-26    140  |  106  |  46<H>  ----------------------------<  130<H>  3.6   |  23  |  1.59<H>    Ca    8.5      26 Feb 2024 05:56  Phos  3.1     02-26  Mg     1.90     02-26    CARDIAC MARKERS ( 24 Feb 2024 10:00 )  197 ng/L / x     / x     / x     / x     / x      CARDIAC MARKERS ( 23 Feb 2024 11:32 )  161 ng/L / x     / x     / 124 U/L / x     / 3.5 ng/mL  CARDIAC MARKERS ( 23 Feb 2024 10:10 )  171 ng/L / x     / x     / x     / x     / x

## 2024-02-26 NOTE — PROGRESS NOTE ADULT - ASSESSMENT
68 y.o M with hx of CAD s/p stents to prox and mid LCx (2003) and eventual CABG x 3 vessels in 2004, with subsequent PCI with ARMAND to RCA (2005) and known mLCx 100% IRS with collaterals from D1 from [facility] from 2016, who also has a hx of HFrEF (EF 40 - 45%), HTN, HLD and DM2 who presented to the ED chest pain. ECG showed new atrial fibrillation with a known LBBB.     1. Chest pain   #Troponin elevation  S/p cath, patent stents, medical management    - Hs- troponin elevation may be secondary to new a.fib in the setting of a cardiac substrate with significant coronary disease with multiple interventions   - Given his risk profile, would also like to rule out ischemic disease  - Trend trops with CK/CMKMB  - Continue home aspirin 81mg daily and plavix 75mg daily  - Repeat TTE    2. Atrial Fibrillation ([id number] = 5 )   - Resume home coreg 25mg BID currently rate controlled  - Can start heparin drip in the meantime   - Eventual plan to start eliquis 5mg BID in place and stop the aspirin so patient is not on triple therapy  - Keep K > 4 and Mg >2  - Telemetry monitoring    3. HFrE EF 40-45%  - Does not appear to be in clinical heart failure  - Coreg 25mg BID  - Entresto 97/103mg BID, will need evaluation of what his baseline creatinine is, please repeat BMP 
69 yo M w/ hx CAD s/p stent CABG, HTN, DM on insulin p/w chest pain. + exertional angina and new onset afib
68 y.o M with hx of CAD s/p stents to prox and mid LCx (2003) and eventual CABG x 3 vessels in 2004, with subsequent PCI with ARMAND to RCA (2005) and known mLCx 100% IRS with collaterals from D1 from [facility] from 2016, who also has a hx of HFrEF (EF 40 - 45%), HTN, HLD and DM2 who presented to the ED chest pain. ECG showed new atrial fibrillation with a known LBBB.     1. Chest pain   #Troponin elevation  S/p cath, patent stents, medical management    - Hs- troponin elevation may be secondary to new a.fib in the setting of a cardiac substrate with significant coronary disease with multiple interventions   - Continue plavix 75mg daily (can stay off aspirin)  - Repeat TTE    2. Atrial Fibrillation ([id number] = 5 )   - Resume home coreg 25mg BID currently rate controlled   - Eliquis 5mg BID  - Keep K > 4 and Mg >2  - Telemetry monitoring    3. HFrE EF 40-45%  - Does not appear to be in clinical heart failure  - Coreg 25mg BID  - Would stop amlodipine and restart home entresto tomorrow (GFR is stable)  - Entresto 97/103mg BID     Can be discharged with close follow up with cardiologist Dr. Read    Recommendations are preliminary until attending attestation.    Michael Pablo MD  Cardiology Fellow
67 yo M w/ hx CAD s/p stent CABG, HTN, DM on insulin p/w chest pain. + exertional angina and new onset afib

## 2024-02-26 NOTE — DISCHARGE NOTE NURSING/CASE MANAGEMENT/SOCIAL WORK - PATIENT PORTAL LINK FT
You can access the FollowMyHealth Patient Portal offered by [facility] by registering at the following website: [email or web address]. By joining [employer]’s FollowMyHealth portal, you will also be able to view your health information using other applications (apps) compatible with our system.

## 2024-02-26 NOTE — PROGRESS NOTE ADULT - ATTENDING COMMENTS
rate controlled  continue current meds  outpt follow-up
Mikael Stephens is a 68 year old man with history of CAD s/p PCI with stents to prox and mid LCx (2003) and eventual CABG x 3 vessels 2004, with subsequent PCI with ARMAND to RCA (2005) and known mLCx 100% IRS with collaterals from D1 from [facility] from 2016. There is also h/o HFrEF (EF 40 - 45%), HTN, HLD and DM2. He presented to the ED with chest pain and elevation of HSTropT and serum proBNP. ECG showed new atrial fibrillation with known LBBB. LHC noted patent stents, thus no PCI was done. Elevation of HSTropT in setting of afib and complex CAD may in part be demand ischemia. Current managment includes continuing to rend HS Trop T / CK / CMKMB. Maintain aspirin 81 mg daily and clopidogrel (Plavix) 75 mg daily. There afib with [id number] = 5 points. Resume home carvedilol (Coreg) 25mg oral twice daily. May start heparin IV for anticoagulation with eventual plan to start apixaban (Eliquis) 5 mg oral twice daily. At time, discontinue aspirin, since triple therapy (Eliquis, aspirin, Plavix) poses significant risk of bleeding complication. There is  HFrEF, with LVEF 40-45%, currently does not appear to be in clinical heart failure. In addition to Coreg 25mg BID, maintain sacubitril – valsartan (Entresto) 97/103mg BID, monitoring serum potassium and creatinine.

## 2024-02-27 ENCOUNTER — TRANSCRIPTION ENCOUNTER (OUTPATIENT)
Age: 69
End: 2024-02-27

## 2024-02-29 NOTE — CHART NOTE - NSCHARTNOTEFT_GEN_A_CORE
MARIANNE HENNING  MRN-[id number]    Patient status post LHC via right femoral access. Dressing site clean, dry, and intact. No hematoma or active bleeding. Extremities warm to touch. Pulses present b/l, capillary refill appropriate. No bruits noted on ausculation of femoral artery. Denies any pain, numbness or tingling. Pt currently requiring 4LNC, no acute distress. Will continue to monitor.     T(C): 37 (02-23-24 @ 21:15), Max: 37 (02-23-24 @ 21:15)  HR: 94 (02-23-24 @ 21:15) (64 - 94)  BP: 164/66 (02-23-24 @ 21:15) (107/85 - 164/66)  RR: 22 (02-23-24 @ 21:15) (16 - 22)  SpO2: 94% (02-23-24 @ 21:15) (94% - 95%)      Charles Buenrostro PA-C  Department of Medicine  [facility]   In House Page [phone]
pt seen and examined. vitals, labs, echo result, cardiology recs reviewed    pt is stable for discharge home today. f/u cardiology outpt    total time spent on dc 37min
Post-Discharge Medication Review    Patient's preferred pharmacy was updated in OMR: [email or web address]    Patient contacted to offer medication counseling post-discharge. Medication reconciliation completed.     Per patient, medications include:  1. apixaban 5 mg oral tablet 1 tab(s) orally every 12 hours  2. carvedilol 25 mg oral tablet 1 tab(s) orally 2 times a day  3. Entresto 97 mg-103 mg oral tablet 1 tab(s) orally 2 times a day  4. Lantus 100 units/mL subcutaneous solution 48 unit(s) subcutaneous once a day (at bedtime)  5. NovoLOG 100 units/mL subcutaneous solution 8 unit(s) subcutaneous 3 times a day (with meals)  6. pantoprazole 40 mg oral delayed release tablet 1 tab(s) orally once a day (before a meal)  7. Plavix 75 mg oral tablet 1 tab(s) orally once a day  8. simvastatin 40 mg oral tablet 1 tab(s) orally once a day (at bedtime)  9. glucose tabs (per patient; to be added to OMR)  10. vit D2 50,000 units 1 cap twice a week (per patient; to be added to OMR)    Patient stated not taking the following (to be removed from OMR):  - azithromycin 250 mg oral tablet 1 tab(s) orally once a day (patient completed course of therapy)  - glucose 40% oral gel 15 gram(s) orally prn  - melatonin 3 mg oral tablet 1 tab(s) orally once a day (at bedtime) As needed Insomnia    Medication name, indication, administration, side effect, and monitoring reviewed for new medications post-discharge with patient. Patient demonstrated understanding. Counseling offered for all medications.    Discussed with patient that per Discharge Instructions, amlodipine to be discontinued. Patient planning on discussing with outpatient cardiologist. Additionally, pt planning on discussing statin therapy with outpatient provider.     Patient stated has outpatient PCP appt scheduled for 3/1/24 and will follow-up to schedule outpatient cardiologist appt.

## 2024-03-01 DIAGNOSIS — I48.0 PAROXYSMAL ATRIAL FIBRILLATION: ICD-10-CM

## 2024-03-01 RX ORDER — APIXABAN 5 MG/1
5 TABLET, FILM COATED ORAL
Qty: 90 | Refills: 3 | Status: ACTIVE | COMMUNITY
Start: 1900-01-01 | End: 1900-01-01

## 2024-03-07 RX ORDER — SACUBITRIL AND VALSARTAN 24; 26 MG/1; MG/1
1 TABLET, FILM COATED ORAL
Refills: 0 | DISCHARGE

## 2024-03-07 RX ORDER — CARVEDILOL PHOSPHATE 80 MG/1
1 CAPSULE, EXTENDED RELEASE ORAL
Refills: 0 | DISCHARGE

## 2024-03-28 ENCOUNTER — APPOINTMENT (OUTPATIENT)
Dept: CARDIOLOGY | Facility: CLINIC | Age: 69
End: 2024-03-28
Payer: MEDICARE

## 2024-03-28 ENCOUNTER — NON-APPOINTMENT (OUTPATIENT)
Age: 69
End: 2024-03-28

## 2024-03-28 VITALS — BODY MASS INDEX: 31.29 KG/M2 | HEIGHT: 72 IN | WEIGHT: 231 LBS

## 2024-03-28 VITALS — DIASTOLIC BLOOD PRESSURE: 71 MMHG | OXYGEN SATURATION: 97 % | SYSTOLIC BLOOD PRESSURE: 151 MMHG | HEART RATE: 61 BPM

## 2024-03-28 DIAGNOSIS — I50.22 CHRONIC SYSTOLIC (CONGESTIVE) HEART FAILURE: ICD-10-CM

## 2024-03-28 DIAGNOSIS — I10 ESSENTIAL (PRIMARY) HYPERTENSION: ICD-10-CM

## 2024-03-28 DIAGNOSIS — E78.5 HYPERLIPIDEMIA, UNSPECIFIED: ICD-10-CM

## 2024-03-28 DIAGNOSIS — I25.10 ATHEROSCLEROTIC HEART DISEASE OF NATIVE CORONARY ARTERY W/OUT ANGINA PECTORIS: ICD-10-CM

## 2024-03-28 PROCEDURE — 99214 OFFICE O/P EST MOD 30 MIN: CPT

## 2024-03-28 PROCEDURE — G2211 COMPLEX E/M VISIT ADD ON: CPT

## 2024-03-28 PROCEDURE — 93000 ELECTROCARDIOGRAM COMPLETE: CPT

## 2024-03-28 RX ORDER — SIMVASTATIN 40 MG/1
40 TABLET, FILM COATED ORAL DAILY
Refills: 0 | Status: DISCONTINUED | COMMUNITY
End: 2024-03-28

## 2024-03-28 RX ORDER — ATORVASTATIN CALCIUM 40 MG/1
40 TABLET, FILM COATED ORAL DAILY
Qty: 90 | Refills: 3 | Status: ACTIVE | COMMUNITY
Start: 2024-03-28 | End: 1900-01-01

## 2024-03-28 NOTE — PHYSICAL EXAM
[Well Developed] : well developed [Well Nourished] : well nourished [No Acute Distress] : no acute distress [Normal Conjunctiva] : normal conjunctiva [Normal Venous Pressure] : normal venous pressure [No Carotid Bruit] : no carotid bruit [Normal S1, S2] : normal S1, S2 [No Murmur] : no murmur [No Gallop] : no gallop [No Rub] : no rub [Clear Lung Fields] : clear lung fields [Good Air Entry] : good air entry [No Respiratory Distress] : no respiratory distress  [Soft] : abdomen soft [Non Tender] : non-tender [Normal Gait] : normal gait [No Edema] : no edema [No Cyanosis] : no cyanosis [No Rash] : no rash [No Skin Lesions] : no skin lesions [No Focal Deficits] : no focal deficits [Moves all extremities] : moves all extremities [Normal Speech] : normal speech [Alert and Oriented] : alert and oriented

## 2024-03-31 NOTE — CARDIOLOGY SUMMARY
[de-identified] : 03/28/24 - sinus rhythm, first degree AV block, LBBB [de-identified] : 02/26/24 - MAC, mild MR, normal LA, moderate global LV hypokinesis, LVEF 35-40% 03/30/23 - MAC, calcified AV with normal opening, moderate segmental LV systolic dysfunction, mild diastolic dysfunction, normal RV size and function, LVEF 40-45% 12/28/18 - normal LA, mild to moderate global LV systolic dysfunction, normal RV size and function, RVSP 36 mmHg, LVEF 35% [de-identified] : 12/28/18 (regadenoson MIBI) - small, moderate perfusion defect in basal inferolateral and lateral walls that is predominantly reversible, consistent with infarction with moderate edwina-infarct ischemia, LVEF 42% [de-identified] : 02/23/24 (CATH) - mLM 20%, %, pCx 100% ISR, pRCA 30%, mRCA 30%, dRCA 20%, pRPL1 70%, patent LIMA to mLAD, LVEDP 15 mmHg 08/16/16 (CATH) - mLM 20%, pLAD 50%, mLAD 90%, oCx 80%, mCx 100% ISR with collaterals from D1, mRamus 60%, oRCA 20% ISR, mRCA 30%, patent LIMA-mLAD, LVEDP 14 mmHg 11/15/05 (PCI) - TAXUS stent to oRCA 10/14/03 (PCI) - CYPHER stent to prox/mid Cx 04/21/03 (PCI) - BMS to pCx [de-identified] : \par  06/29/04 - CABG x 3 vessels

## 2024-03-31 NOTE — DISCUSSION/SUMMARY
[Coronary Artery Disease] : coronary artery disease [Systolic Heart Failure] : systolic heart failure [Compensated] : compensated [Stable] : stable [Hypertension] : hypertension [Low Sodium Diet] : low sodium diet [EKG obtained to assist in diagnosis and management of assessed problem(s)] : EKG obtained to assist in diagnosis and management of assessed problem(s) [Hyperlipidemia] : hyperlipidemia [Medication Changes Per Orders] : Medication changes are as documented in orders [FreeTextEntry1] : Currently stable from a cardiac standpoint. Hypertensive today. Chronic systolic heart failures secondary to ischemic cardiomyopathy (LVEF 35-40%). Currently euvolemic. Stable CAD (s/p CABG, RCA and Cx stents). Asymptomatic. At this time, patient is considered ACC/AHA CHF stage B. Will stop simvastatin due to concerns of its potential interaction with amlodipine. Will start atorvastatin 40 mg daily. Continue all other current medications. ECG completed today and reviewed. Patient to monitor BP readings at home. Low salt diet. Follow up in 3-4 months.

## 2024-06-28 ENCOUNTER — NON-APPOINTMENT (OUTPATIENT)
Age: 69
End: 2024-06-28

## 2024-07-18 ENCOUNTER — APPOINTMENT (OUTPATIENT)
Dept: CARDIOLOGY | Facility: CLINIC | Age: 69
End: 2024-07-18

## 2024-08-22 ENCOUNTER — APPOINTMENT (OUTPATIENT)
Dept: CARDIOLOGY | Facility: CLINIC | Age: 69
End: 2024-08-22

## 2024-09-12 ENCOUNTER — APPOINTMENT (OUTPATIENT)
Dept: CARDIOLOGY | Facility: CLINIC | Age: 69
End: 2024-09-12
Payer: MEDICARE

## 2024-09-12 ENCOUNTER — NON-APPOINTMENT (OUTPATIENT)
Age: 69
End: 2024-09-12

## 2024-09-12 VITALS
OXYGEN SATURATION: 94 % | WEIGHT: 229 LBS | HEART RATE: 58 BPM | DIASTOLIC BLOOD PRESSURE: 66 MMHG | BODY MASS INDEX: 31.02 KG/M2 | HEIGHT: 72 IN | SYSTOLIC BLOOD PRESSURE: 128 MMHG

## 2024-09-12 DIAGNOSIS — I48.0 PAROXYSMAL ATRIAL FIBRILLATION: ICD-10-CM

## 2024-09-12 DIAGNOSIS — I10 ESSENTIAL (PRIMARY) HYPERTENSION: ICD-10-CM

## 2024-09-12 DIAGNOSIS — I50.22 CHRONIC SYSTOLIC (CONGESTIVE) HEART FAILURE: ICD-10-CM

## 2024-09-12 DIAGNOSIS — I25.10 ATHEROSCLEROTIC HEART DISEASE OF NATIVE CORONARY ARTERY W/OUT ANGINA PECTORIS: ICD-10-CM

## 2024-09-12 DIAGNOSIS — E78.5 HYPERLIPIDEMIA, UNSPECIFIED: ICD-10-CM

## 2024-09-12 PROCEDURE — 93000 ELECTROCARDIOGRAM COMPLETE: CPT

## 2024-09-12 PROCEDURE — 99214 OFFICE O/P EST MOD 30 MIN: CPT

## 2024-09-12 PROCEDURE — G2211 COMPLEX E/M VISIT ADD ON: CPT

## 2024-09-12 RX ORDER — EPLERENONE 25 MG/1
25 TABLET, COATED ORAL DAILY
Refills: 0 | Status: ACTIVE | COMMUNITY

## 2024-09-12 RX ORDER — HYDROCHLOROTHIAZIDE 12.5 MG/1
12.5 TABLET ORAL DAILY
Qty: 90 | Refills: 3 | Status: ACTIVE | COMMUNITY

## 2024-09-15 NOTE — HISTORY OF PRESENT ILLNESS
[FreeTextEntry1] : Currently doing well. Denies chest pain, shortness of breath or palpitations. Experiences hip and back pains.

## 2024-09-15 NOTE — DISCUSSION/SUMMARY
[Coronary Artery Disease] : coronary artery disease [Systolic Heart Failure] : systolic heart failure [Hyperlipidemia] : hyperlipidemia [Medication Changes Per Orders] : Medication changes are as documented in orders [Hypertension] : hypertension [Paroxysmal Atrial Fibrillation] : paroxysmal atrial fibrillation [Compensated] : compensated [Stable] : stable [Low Sodium Diet] : low sodium diet [FreeTextEntry1] : Currently stable from a cardiac standpoint. Normotensive Chronic systolic heart failure secondary to ischemic cardiomyopathy (LVEF 35-40%). Currently euvolemic. Stable CAD (s/p CABG x3 vessels, RCA and Cx stents). Asymptomatic. At this time, patient is considered ACC/AHA CHF stage B. Continue current medications including carvedilol, Entresto, and eplerenone. ECG completed today and reviewed. Low salt diet. Follow up in 4 months. [EKG obtained to assist in diagnosis and management of assessed problem(s)] : EKG obtained to assist in diagnosis and management of assessed problem(s)

## 2024-09-15 NOTE — CARDIOLOGY SUMMARY
[de-identified] : 09/12/24 - sinus rhythm, first degree AV block, LBBB [de-identified] : 12/28/18 (regadenoson MIBI) - small, moderate perfusion defect in basal inferolateral and lateral walls that is predominantly reversible, consistent with infarction with moderate edwina-infarct ischemia, LVEF 42% [de-identified] : 02/23/24 (CATH) - mLM 20%, %, pCx 100% ISR, pRCA 30%, mRCA 30%, dRCA 20%, pRPL1 70%, patent LIMA to mLAD, LVEDP 15 mmHg 08/16/16 (CATH) - mLM 20%, pLAD 50%, mLAD 90%, oCx 80%, mCx 100% ISR with collaterals from D1, mRamus 60%, oRCA 20% ISR, mRCA 30%, patent LIMA-mLAD, LVEDP 14 mmHg 11/15/05 (PCI) - TAXUS stent to oRCA 10/14/03 (PCI) - CYPHER stent to prox/mid Cx 04/21/03 (PCI) - BMS to pCx [de-identified] : 02/26/24 - MAC, mild MR, normal LA, moderate global LV hypokinesis, LVEF 35-40% 03/30/23 - MAC, calcified AV with normal opening, moderate segmental LV systolic dysfunction, mild diastolic dysfunction, normal RV size and function, LVEF 40-45% 12/28/18 - normal LA, mild to moderate global LV systolic dysfunction, normal RV size and function, RVSP 36 mmHg, LVEF 35% [de-identified] : \par  06/29/04 - CABG x 3 vessels

## 2024-09-15 NOTE — CARDIOLOGY SUMMARY
[de-identified] : 09/12/24 - sinus rhythm, first degree AV block, LBBB [de-identified] : 12/28/18 (regadenoson MIBI) - small, moderate perfusion defect in basal inferolateral and lateral walls that is predominantly reversible, consistent with infarction with moderate edwina-infarct ischemia, LVEF 42% [de-identified] : 02/26/24 - MAC, mild MR, normal LA, moderate global LV hypokinesis, LVEF 35-40% 03/30/23 - MAC, calcified AV with normal opening, moderate segmental LV systolic dysfunction, mild diastolic dysfunction, normal RV size and function, LVEF 40-45% 12/28/18 - normal LA, mild to moderate global LV systolic dysfunction, normal RV size and function, RVSP 36 mmHg, LVEF 35% [de-identified] : 02/23/24 (CATH) - mLM 20%, %, pCx 100% ISR, pRCA 30%, mRCA 30%, dRCA 20%, pRPL1 70%, patent LIMA to mLAD, LVEDP 15 mmHg 08/16/16 (CATH) - mLM 20%, pLAD 50%, mLAD 90%, oCx 80%, mCx 100% ISR with collaterals from D1, mRamus 60%, oRCA 20% ISR, mRCA 30%, patent LIMA-mLAD, LVEDP 14 mmHg 11/15/05 (PCI) - TAXUS stent to oRCA 10/14/03 (PCI) - CYPHER stent to prox/mid Cx 04/21/03 (PCI) - BMS to pCx [de-identified] : \par  06/29/04 - CABG x 3 vessels

## 2025-02-04 ENCOUNTER — RX RENEWAL (OUTPATIENT)
Age: 70
End: 2025-02-04

## 2025-02-19 NOTE — ED PROVIDER NOTE - IV ALTEPLASE INCLUSION HIDDEN
----- Message from Stanley sent at 2/19/2025  7:42 AM CST -----  .Type: Patient Call Back  Who called:  Patient mother What is the request in detail:  Called in concerning patient having a soar throat. Patient mother would like to know can the provider fit the patient in today. Please call back Can the clinic reply by [facility]?     Would the patient rather a call back or a response via [facility]?  call Best call back number:.[phone]  
Addressed in separate encounter.   
show

## 2025-04-03 ENCOUNTER — NON-APPOINTMENT (OUTPATIENT)
Age: 70
End: 2025-04-03

## 2025-04-03 ENCOUNTER — APPOINTMENT (OUTPATIENT)
Dept: CARDIOLOGY | Facility: CLINIC | Age: 70
End: 2025-04-03
Payer: MEDICARE

## 2025-04-03 VITALS
HEART RATE: 58 BPM | OXYGEN SATURATION: 97 % | SYSTOLIC BLOOD PRESSURE: 112 MMHG | WEIGHT: 228 LBS | HEIGHT: 72 IN | TEMPERATURE: 98.1 F | BODY MASS INDEX: 30.88 KG/M2 | DIASTOLIC BLOOD PRESSURE: 61 MMHG

## 2025-04-03 DIAGNOSIS — I48.0 PAROXYSMAL ATRIAL FIBRILLATION: ICD-10-CM

## 2025-04-03 DIAGNOSIS — I50.22 CHRONIC SYSTOLIC (CONGESTIVE) HEART FAILURE: ICD-10-CM

## 2025-04-03 DIAGNOSIS — I25.10 ATHEROSCLEROTIC HEART DISEASE OF NATIVE CORONARY ARTERY W/OUT ANGINA PECTORIS: ICD-10-CM

## 2025-04-03 DIAGNOSIS — I10 ESSENTIAL (PRIMARY) HYPERTENSION: ICD-10-CM

## 2025-04-03 PROCEDURE — 99214 OFFICE O/P EST MOD 30 MIN: CPT

## 2025-04-03 PROCEDURE — G2211 COMPLEX E/M VISIT ADD ON: CPT

## 2025-04-03 PROCEDURE — 93000 ELECTROCARDIOGRAM COMPLETE: CPT

## 2025-05-28 ENCOUNTER — RX RENEWAL (OUTPATIENT)
Age: 70
End: 2025-05-28

## 2025-06-30 ENCOUNTER — NON-APPOINTMENT (OUTPATIENT)
Age: 70
End: 2025-06-30

## 2025-08-14 ENCOUNTER — NON-APPOINTMENT (OUTPATIENT)
Age: 70
End: 2025-08-14

## 2025-08-14 ENCOUNTER — APPOINTMENT (OUTPATIENT)
Dept: CARDIOLOGY | Facility: CLINIC | Age: 70
End: 2025-08-14
Payer: MEDICARE

## 2025-08-14 VITALS
OXYGEN SATURATION: 97 % | WEIGHT: 231 LBS | HEIGHT: 72 IN | SYSTOLIC BLOOD PRESSURE: 148 MMHG | DIASTOLIC BLOOD PRESSURE: 69 MMHG | BODY MASS INDEX: 31.29 KG/M2 | HEART RATE: 56 BPM

## 2025-08-14 VITALS — SYSTOLIC BLOOD PRESSURE: 147 MMHG | DIASTOLIC BLOOD PRESSURE: 70 MMHG

## 2025-08-14 DIAGNOSIS — I50.22 CHRONIC SYSTOLIC (CONGESTIVE) HEART FAILURE: ICD-10-CM

## 2025-08-14 DIAGNOSIS — I48.0 PAROXYSMAL ATRIAL FIBRILLATION: ICD-10-CM

## 2025-08-14 DIAGNOSIS — I25.10 ATHEROSCLEROTIC HEART DISEASE OF NATIVE CORONARY ARTERY W/OUT ANGINA PECTORIS: ICD-10-CM

## 2025-08-14 DIAGNOSIS — I10 ESSENTIAL (PRIMARY) HYPERTENSION: ICD-10-CM

## 2025-08-14 PROCEDURE — 93000 ELECTROCARDIOGRAM COMPLETE: CPT

## 2025-08-14 PROCEDURE — 99215 OFFICE O/P EST HI 40 MIN: CPT

## 2025-08-14 PROCEDURE — 99205 OFFICE O/P NEW HI 60 MIN: CPT

## 2025-08-14 PROCEDURE — G2211 COMPLEX E/M VISIT ADD ON: CPT

## 2025-08-14 RX ORDER — HYDRALAZINE HYDROCHLORIDE 25 MG/1
25 TABLET ORAL TWICE DAILY
Qty: 60 | Refills: 5 | Status: ACTIVE | COMMUNITY